# Patient Record
Sex: FEMALE | Race: WHITE | NOT HISPANIC OR LATINO | Employment: UNEMPLOYED | URBAN - METROPOLITAN AREA
[De-identification: names, ages, dates, MRNs, and addresses within clinical notes are randomized per-mention and may not be internally consistent; named-entity substitution may affect disease eponyms.]

---

## 2017-05-25 DIAGNOSIS — R06.02 SHORTNESS OF BREATH: ICD-10-CM

## 2017-05-25 DIAGNOSIS — R07.9 CHEST PAIN: Primary | ICD-10-CM

## 2017-07-17 ENCOUNTER — OFFICE VISIT (OUTPATIENT)
Dept: NEUROLOGY | Facility: HOSPITAL | Age: 57
End: 2017-07-17
Attending: INTERNAL MEDICINE
Payer: COMMERCIAL

## 2017-07-17 VITALS
WEIGHT: 264.81 LBS | HEIGHT: 66 IN | HEART RATE: 56 BPM | SYSTOLIC BLOOD PRESSURE: 141 MMHG | BODY MASS INDEX: 42.56 KG/M2 | DIASTOLIC BLOOD PRESSURE: 72 MMHG | TEMPERATURE: 98 F

## 2017-07-17 DIAGNOSIS — R07.9 CHEST PAIN, UNSPECIFIED TYPE: Primary | ICD-10-CM

## 2017-07-17 PROCEDURE — 99214 OFFICE O/P EST MOD 30 MIN: CPT | Performed by: INTERNAL MEDICINE

## 2017-07-17 RX ORDER — IBUPROFEN 200 MG
200 TABLET ORAL EVERY 6 HOURS PRN
COMMUNITY
End: 2021-10-06

## 2017-07-17 RX ORDER — ASPIRIN 81 MG/1
81 TABLET ORAL DAILY
COMMUNITY
End: 2018-08-23

## 2017-07-17 NOTE — PROGRESS NOTES
"[facility]    CC: abdominal pain     HPI 56 y.o. female with two months of constant mld epigastric pain not associated with vomiting. Pertinent history is significant for ERCP (10/2016) with purulent bile c/w cholangitis and 8 mm obstructing stone and biliary sludge. Stone was removed at that time and patient then had a lap tristin (10/2016). She states following the surgery she had no further pain until two months ago when pain started at her left shoulder/back and radiated to her chest. It was not associated with shortness of breath. She was evaluated by a cardiologist; NM perfusion scan was negative. A week later she then had epigastric pain that radiated to her chest. She states pain is dull and different than her usual acid reflux pain. She states pain is worse with movement. She denies any improvement or worsening with Tums or food. She intermittently has the shoulder and chest pain. She ahd a EGD in 2016 that showed reflux esophagitis at [facility] and colonoscopy in 2016 that showed a 3mm polyp that was removed (path with tubular adenoma).      Past Medical History:   Diagnosis Date    Arthritis     Claustrophobia     Fibromyalgia     GERD (gastroesophageal reflux disease)     Hypertension          Review of Systems  General ROS: negative for chills, fever or weight loss  Cardiovascular ROS: no chest pain or dyspnea on exertion  Gastrointestinal ROS: positive abdominal pain, no change in bowel habits, or black/ bloody stools    Physical Examination  BP (!) 141/72   Pulse (!) 56   Temp 98 °F (36.7 °C) (Oral)   Ht 5' 6" (1.676 m)   Wt 120.1 kg (264 lb 12.8 oz)   BMI 42.74 kg/m²   General appearance: alert, cooperative, no distress  HENT: Normocephalic, atraumatic, neck symmetrical, no nasal discharge   Lungs: clear to auscultation bilaterally, no dullness to percussion bilaterally  Heart: regular rate and rhythm without rub; no displacement of the PMI   Abdomen: ventral hernia present, moderate " tenderness with deep palpation, soft; bowel sounds normoactive; no organomegaly  Extremities: extremities symmetric; no clubbing, cyanosis, or edema  Neurologic: Alert and oriented X 3, normal strength, normal coordination and gait    Labs:  Lipase 87  T.mary 0.7  Alk phos 95  AST 28  ALT 32    Imaging:  CXR (5/31/17): There is no pneumothorax or pleural fluid. The cardiac silhouette is not enlarged.     I have personally reviewed the above film.     Assessment:   56 year old female with 2 months constant epigastric pain, worse with movement suspicious for costochondritis. She has chronic typical GERD which is treated with antacids. Her epigastric/chest pain is not worse or better with eating and is relatively mild. Evaluation for CAD by stress test is negative     Plan:  - Pain more consistent with costochondritis. Ordered Xray of ribs, 2 view to further evaluate. Will call patient with results.   - Recommended trial of Naprosyn for pain. Will call patient to evaluate for improvement in pain with therapy in 1-2 weeks.   -Conservative management unless pain is progressive in nature     Yamil Quinteros MD   [address] [place] [address], [address]   [place] [address] [phone]

## 2017-08-02 ENCOUNTER — TELEPHONE (OUTPATIENT)
Dept: NEUROLOGY | Facility: HOSPITAL | Age: 57
End: 2017-08-02

## 2017-08-02 NOTE — TELEPHONE ENCOUNTER
----- Message from Lolis Miller sent at 8/2/2017  2:41 PM CDT -----  Contact: Patient  GI- Patient called to get her xray results. Call back number [phone]

## 2017-08-07 ENCOUNTER — TELEPHONE (OUTPATIENT)
Dept: NEUROLOGY | Facility: HOSPITAL | Age: 57
End: 2017-08-07

## 2017-08-07 NOTE — TELEPHONE ENCOUNTER
[facility] Note    I have left a message for the patient informing her that the Xray of her ribs was unrevealing for a cause for her pain.  I have requested that she call my office to let me know how her symptoms are and if her pain is improved with naprosyn.

## 2017-08-09 ENCOUNTER — TELEPHONE (OUTPATIENT)
Dept: NEUROLOGY | Facility: HOSPITAL | Age: 57
End: 2017-08-09

## 2017-08-09 NOTE — TELEPHONE ENCOUNTER
----- Message from Sangeetha Bronson sent at 8/9/2017  2:55 PM CDT -----  Contact: Patient  GI- Patient would like a call back as soon as possible. She has questions. Patient can be reached at [phone].

## 2017-08-16 ENCOUNTER — TELEPHONE (OUTPATIENT)
Dept: NEUROLOGY | Facility: HOSPITAL | Age: 57
End: 2017-08-16

## 2018-04-19 NOTE — TELEPHONE ENCOUNTER
----- Message from aSngeetha Bronson sent at 8/15/2017 10:41 AM CDT -----  Contact: Patient  GI- patient left [place] paperwork over a month ago and needs to pick it up today at 2 pm. Please leave at . Patient can be reached at  [phone].  
Pt waiting in lobby for paperwork.  Pt instructed Dr. Quinteros in clinic and requested to wait until paperwork is signed.      Pt received paperwork with MD's signature.   
(0) understands/communicates without difficulty

## 2018-05-11 ENCOUNTER — OFFICE VISIT (OUTPATIENT)
Dept: URGENT CARE | Facility: CLINIC | Age: 58
End: 2018-05-11
Payer: COMMERCIAL

## 2018-05-11 VITALS
RESPIRATION RATE: 18 BRPM | DIASTOLIC BLOOD PRESSURE: 90 MMHG | BODY MASS INDEX: 42.43 KG/M2 | WEIGHT: 264 LBS | OXYGEN SATURATION: 98 % | TEMPERATURE: 99 F | HEIGHT: 66 IN | SYSTOLIC BLOOD PRESSURE: 163 MMHG | HEART RATE: 59 BPM

## 2018-05-11 DIAGNOSIS — R53.1 WEAKNESS: ICD-10-CM

## 2018-05-11 DIAGNOSIS — R05.9 COUGH: ICD-10-CM

## 2018-05-11 DIAGNOSIS — J02.0 STREP PHARYNGITIS: Primary | ICD-10-CM

## 2018-05-11 LAB
CTP QC/QA: YES
CTP QC/QA: YES
FLUAV AG NPH QL: NEGATIVE
FLUBV AG NPH QL: NEGATIVE
GLUCOSE SERPL-MCNC: 86 MG/DL (ref 70–110)
POC ANION GAP: 19 MMOL/L
POC BUN: 15 MMOL/L
POC CHLORIDE: 99 MMOL/L
POC CREATININE: 1 MG/DL (ref 0.6–1.3)
POC HEMATOCRIT: 42 %PCV (ref 37–47)
POC HEMOGLOBIN: 14.3 G/DL (ref 12.5–16)
POC ICA: 1.17 MMOL/L
POC POTASSIUM: 3.7 MMOL/L
POC SODIUM: 142 MMOL/L
POC TCO2: 29 MMOL/L
S PYO RRNA THROAT QL PROBE: POSITIVE

## 2018-05-11 PROCEDURE — 99213 OFFICE O/P EST LOW 20 MIN: CPT | Mod: S$GLB,,, | Performed by: NURSE PRACTITIONER

## 2018-05-11 PROCEDURE — 87804 INFLUENZA ASSAY W/OPTIC: CPT | Mod: 59,QW,S$GLB, | Performed by: NURSE PRACTITIONER

## 2018-05-11 PROCEDURE — 80047 BASIC METABLC PNL IONIZED CA: CPT | Mod: QW,S$GLB,, | Performed by: NURSE PRACTITIONER

## 2018-05-11 PROCEDURE — 87880 STREP A ASSAY W/OPTIC: CPT | Mod: QW,S$GLB,, | Performed by: NURSE PRACTITIONER

## 2018-05-11 RX ORDER — AMOXICILLIN 875 MG/1
875 TABLET, FILM COATED ORAL EVERY 12 HOURS
Qty: 20 TABLET | Refills: 0 | Status: SHIPPED | OUTPATIENT
Start: 2018-05-11 | End: 2018-05-21

## 2018-05-11 RX ORDER — BENZONATATE 100 MG/1
200 CAPSULE ORAL 3 TIMES DAILY PRN
Qty: 20 CAPSULE | Refills: 0 | Status: SHIPPED | OUTPATIENT
Start: 2018-05-11 | End: 2018-08-23

## 2018-05-11 NOTE — PROGRESS NOTES
"Subjective:       Patient ID: Dipika Pisano is a 57 y.o. female.    Vitals:  height is 5' 6" (1.676 m) and weight is 119.7 kg (264 lb). Her temperature is 99 °F (37.2 °C). Her blood pressure is 163/90 (abnormal) and her pulse is 59 (abnormal). Her respiration is 18 and oxygen saturation is 98%.     Chief Complaint: Weakness    This is a 57 y.o. female with Past Medical History:  No date: Arthritis  No date: Claustrophobia  No date: Fibromyalgia  No date: GERD (gastroesophageal reflux disease)  No date: Hypertension   Past Surgical History:  No date: BREAST BIOPSY Left  No date: EYE SURGERY Right  who presents today with a chief complaint of clammy, congestion and cough since Wednesday.  Vomiting on   Wednesday appetite loss until yesterday, able to eat and drink yesterday.   Yesterday with soft bowel.  Did have one loose stool yesterday.  Did have urinary frequency.  Last night thought she was improved, but feel worse.    Denies chest pain with or without radiation to arm or neck, denies HA.          Cough   This is a new problem. The current episode started in the past 7 days. The problem has been unchanged. The problem occurs every few hours. The cough is non-productive. Associated symptoms include myalgias, nasal congestion and sweats. Pertinent negatives include no chest pain, chills, fever, headaches, postnasal drip, rash, sore throat or shortness of breath. Nothing aggravates the symptoms. She has tried nothing for the symptoms. There is no history of asthma.     Review of Systems   Constitution: Positive for weakness. Negative for chills and fever.   HENT: Positive for congestion. Negative for postnasal drip and sore throat.    Eyes: Negative for blurred vision.   Cardiovascular: Negative for chest pain.   Respiratory: Positive for cough. Negative for shortness of breath.    Skin: Negative for rash.   Musculoskeletal: Positive for myalgias. Negative for back pain and joint pain. "   Gastrointestinal: Negative for abdominal pain, diarrhea, nausea and vomiting.   Neurological: Negative for headaches.   Psychiatric/Behavioral: The patient is not nervous/anxious.        Objective:      Physical Exam   Constitutional: She is oriented to person, place, and time. She appears well-developed and well-nourished. She is cooperative.  Non-toxic appearance. She does not appear ill. No distress.   HENT:   Head: Normocephalic and atraumatic.   Right Ear: Hearing, tympanic membrane, external ear and ear canal normal.   Left Ear: Hearing, tympanic membrane, external ear and ear canal normal.   Nose: Nose normal. No mucosal edema, rhinorrhea or nasal deformity. No epistaxis. Right sinus exhibits no maxillary sinus tenderness and no frontal sinus tenderness. Left sinus exhibits no maxillary sinus tenderness and no frontal sinus tenderness.   Mouth/Throat: Uvula is midline and mucous membranes are normal. No trismus in the jaw. Normal dentition. No uvula swelling. Posterior oropharyngeal erythema present. No oropharyngeal exudate, posterior oropharyngeal edema or tonsillar abscesses. Tonsils are 2+ on the right. Tonsils are 2+ on the left. No tonsillar exudate.   Eyes: Conjunctivae and lids are normal. No scleral icterus.   Sclera clear bilat   Neck: Trachea normal, full passive range of motion without pain and phonation normal. Neck supple.   Cardiovascular: Normal rate, regular rhythm, S1 normal, S2 normal, normal heart sounds, intact distal pulses and normal pulses.   No extrasystoles are present. PMI is not displaced.    No murmur heard.  Pulmonary/Chest: Effort normal and breath sounds normal. No respiratory distress. She has no decreased breath sounds. She has no wheezes. She has no rhonchi. She has no rales.   Abdominal: Soft. Normal appearance and bowel sounds are normal. She exhibits no shifting dullness, no distension, no pulsatile liver, no fluid wave, no abdominal bruit, no ascites, no pulsatile  midline mass and no mass. There is no hepatosplenomegaly, splenomegaly or hepatomegaly. There is no tenderness. There is no rigidity, no rebound, no guarding, no CVA tenderness, no tenderness at McBurney's point and negative Mittal's sign.   Musculoskeletal: Normal range of motion. She exhibits no edema or deformity.   Lymphadenopathy:     She has cervical adenopathy.        Right cervical: Superficial cervical adenopathy present. No deep cervical and no posterior cervical adenopathy present.       Left cervical: Superficial cervical adenopathy present. No deep cervical and no posterior cervical adenopathy present.   Neurological: She is alert and oriented to person, place, and time. She exhibits normal muscle tone. Coordination normal.   Skin: Skin is warm, dry and intact. Capillary refill takes less than 2 seconds. She is not diaphoretic. No cyanosis. No pallor. Nails show no clubbing.   Psychiatric: She has a normal mood and affect. Her speech is normal and behavior is normal. Judgment and thought content normal. She is not actively hallucinating. Cognition and memory are normal. She is attentive.   Nursing note and vitals reviewed.      Assessment:       1. Strep pharyngitis    2. Cough    3. Weakness        Plan:     Discussed patient's EKG with Dr. Santos via phone.  Agrees EKG indicates no issues at this time.  Advised to follow up with PCP for recurring weakness.      Results for orders placed or performed in visit on 05/11/18   POCT Influenza A/B   Result Value Ref Range    Rapid Influenza A Ag Negative Negative    Rapid Influenza B Ag Negative Negative     Acceptable Yes    POCT RAPID STREP A   Result Value Ref Range    Rapid Strep A Screen Positive (A) Negative     Acceptable Yes    POCT Chemistry Panel (Manual)   Result Value Ref Range    POC Sodium 142 MMOL/L    POC Potassium 3.7 MMOL/L    POC Chloride 99 MMOL/L    POC BUN 15 MMOL/L    POC Glucose 86 (A) 70 - 110 mg/dL     "POC Creatinine 1.0 (A) 0.6 - 1.3 mg/dL    POC iCA 1.17 MMOL/L    POC TCO2 29 MMOL/L    POC Hematocrit 42 37 - 47 %PCV    POC Hemoglobin 14.3 12.5 - 16 g/dL    POC Anion Gap 19 MMOL/L       Strep pharyngitis  -     POCT RAPID STREP A  -     amoxicillin (AMOXIL) 875 MG tablet; Take 1 tablet (875 mg total) by mouth every 12 (twelve) hours.  Dispense: 20 tablet; Refill: 0    Cough  -     X-Ray Chest PA And Lateral; Future; Expected date: 05/11/2018  -     POCT Influenza A/B  -     benzonatate (TESSALON PERLES) 100 MG capsule; Take 2 capsules (200 mg total) by mouth 3 (three) times daily as needed for Cough.  Dispense: 20 capsule; Refill: 0    Weakness  -     POCT Chemistry Panel (Manual)  -     POCT EKG 12-LEAD (NOT FOR [facility] USE); Future; Expected date: 05/11/2018      Patient Instructions     Please follow up with your Primary care provider within 2-5 days if your signs and symptoms have not resolved or worsen.  The usual course of cold symptoms are 10-14 days.     If your condition worsens or fails to improve we recommend that you receive another evaluation at the emergency room immediately or contact your primary medical clinic to discuss your concerns.     You must understand that you have received an Urgent Care treatment only and that you may be released before all of your medical problems are known or treated.   You, the patient, will arrange for follow up care as instructed.     Tylenol or Ibuprofen can also be used as directed for pain/fever unless you have an allergy to them or medical condition such as stomach ulcers, kidney or liver disease or blood thinners etc for which you should not be taking these type of medications.     Take over the counter cough medication as directed as needed for cough.  You should avoid medications with pseudoephedrine or phenylephrine (any medication with "D") if you have high blood pressure as this can cause an elevation in your blood pressure. Instead consider Corcidin HBP " as needed to prevent an elevated blood pressure.     Natural remedies of symptoms (as needed) include humidification, saline nasal sprays, and/or steamy showers.  Increase fluids, warm tea with honey, cough drops as needed.  You may also use salt water gargles for sore throat.  You have been given an antibiotic to treat your condition today.  Please complete the antibiotic as directed on the bottle.     As with any antibiotics, use probiotics and/or high culture yogurt about 2 hours apart from the antibiotic and about 1 week after the antibiotic to replace the gut marina lost with antibiotic use.      If you are female and on BCP use additional methods to prevent pregnancy while on antibiotics and for one cycle after.           Pharyngitis: Strep (Confirmed)    You have had a positive test for strep throat. Strep throat is a contagious illness. It is spread by coughing, kissing or by touching others after touching your mouth or nose. Symptoms include throat pain that is worse with swallowing, aching all over, headache, and fever. It is treated with antibiotic medicine. This should help you start to feel better in 1 to 2 days.  Home care  · Rest at home. Drink plenty of fluids to you won't get dehydrated.  · No work or school for the first 2 days of taking the antibiotics. After this time, you will not be contagious. You can then return to school or work if you are feeling better.   · Take antibiotic medicine for the full 10 days, even if you feel better. This is very important to ensure the infection is treated. It is also important to prevent medicine-resistant germs from developing. If you were given an antibiotic shot, you don't need any more antibiotics.  · You may use acetaminophen or ibuprofen to control pain or fever, unless another medicine was prescribed for this. Talk with your doctor before taking these medicines if you have chronic liver or kidney disease. Also talk with your doctor if you have had a  stomach ulcer or GI bleeding.  · Throat lozenges or sprays help reduce pain. Gargling with warm saltwater will also reduce throat pain. Dissolve 1/2 teaspoon of salt in 1 glass of warm water. This may be useful just before meals.   · Soft foods are OK. Avoid salty or spicy foods.  Follow-up care  Follow up with your healthcare provider or our staff if you don't get better over the next week.  When to seek medical advice  Call your healthcare provider right away if any of these occur:  · Fever of 100.4ºF (38ºC) or higher, or as directed by your healthcare provider  · New or worsening ear pain, sinus pain, or headache  · Painful lumps in the back of neck  · Stiff neck  · Lymph nodes getting larger or becoming soft in the middle  · You can't swallow liquids or you can't open your mouth wide because of throat pain  · Signs of dehydration. These include very dark urine or no urine, sunken eyes, and dizziness.  · Trouble breathing or noisy breathing  · Muffled voice  · Rash  Date Last Reviewed: 4/13/2015  © 0444-8547 [employer]. [address] [place] [address]. All rights reserved. This information is not intended as a substitute for professional medical care. Always follow your healthcare professional's instructions.

## 2018-05-11 NOTE — PATIENT INSTRUCTIONS
"  Please follow up with your Primary care provider within 2-5 days if your signs and symptoms have not resolved or worsen.  The usual course of cold symptoms are 10-14 days.     If your condition worsens or fails to improve we recommend that you receive another evaluation at the emergency room immediately or contact your primary medical clinic to discuss your concerns.     You must understand that you have received an Urgent Care treatment only and that you may be released before all of your medical problems are known or treated.   You, the patient, will arrange for follow up care as instructed.     Tylenol or Ibuprofen can also be used as directed for pain/fever unless you have an allergy to them or medical condition such as stomach ulcers, kidney or liver disease or blood thinners etc for which you should not be taking these type of medications.     Take over the counter cough medication as directed as needed for cough.  You should avoid medications with pseudoephedrine or phenylephrine (any medication with "D") if you have high blood pressure as this can cause an elevation in your blood pressure. Instead consider Corcidin HBP as needed to prevent an elevated blood pressure.     Natural remedies of symptoms (as needed) include humidification, saline nasal sprays, and/or steamy showers.  Increase fluids, warm tea with honey, cough drops as needed.  You may also use salt water gargles for sore throat.  You have been given an antibiotic to treat your condition today.  Please complete the antibiotic as directed on the bottle.     As with any antibiotics, use probiotics and/or high culture yogurt about 2 hours apart from the antibiotic and about 1 week after the antibiotic to replace the gut marina lost with antibiotic use.      If you are female and on BCP use additional methods to prevent pregnancy while on antibiotics and for one cycle after.           Pharyngitis: Strep (Confirmed)    You have had a positive test " for strep throat. Strep throat is a contagious illness. It is spread by coughing, kissing or by touching others after touching your mouth or nose. Symptoms include throat pain that is worse with swallowing, aching all over, headache, and fever. It is treated with antibiotic medicine. This should help you start to feel better in 1 to 2 days.  Home care  · Rest at home. Drink plenty of fluids to you won't get dehydrated.  · No work or school for the first 2 days of taking the antibiotics. After this time, you will not be contagious. You can then return to school or work if you are feeling better.   · Take antibiotic medicine for the full 10 days, even if you feel better. This is very important to ensure the infection is treated. It is also important to prevent medicine-resistant germs from developing. If you were given an antibiotic shot, you don't need any more antibiotics.  · You may use acetaminophen or ibuprofen to control pain or fever, unless another medicine was prescribed for this. Talk with your doctor before taking these medicines if you have chronic liver or kidney disease. Also talk with your doctor if you have had a stomach ulcer or GI bleeding.  · Throat lozenges or sprays help reduce pain. Gargling with warm saltwater will also reduce throat pain. Dissolve 1/2 teaspoon of salt in 1 glass of warm water. This may be useful just before meals.   · Soft foods are OK. Avoid salty or spicy foods.  Follow-up care  Follow up with your healthcare provider or our staff if you don't get better over the next week.  When to seek medical advice  Call your healthcare provider right away if any of these occur:  · Fever of 100.4ºF (38ºC) or higher, or as directed by your healthcare provider  · New or worsening ear pain, sinus pain, or headache  · Painful lumps in the back of neck  · Stiff neck  · Lymph nodes getting larger or becoming soft in the middle  · You can't swallow liquids or you can't open your mouth wide  because of throat pain  · Signs of dehydration. These include very dark urine or no urine, sunken eyes, and dizziness.  · Trouble breathing or noisy breathing  · Muffled voice  · Rash  Date Last Reviewed: 4/13/2015 © 2000-2017 [employer]. [address] [place] [address], [place] [address]. All rights reserved. This information is not intended as a substitute for professional medical care. Always follow your healthcare professional's instructions.

## 2018-08-23 ENCOUNTER — OFFICE VISIT (OUTPATIENT)
Dept: URGENT CARE | Facility: CLINIC | Age: 58
End: 2018-08-23
Payer: COMMERCIAL

## 2018-08-23 VITALS
TEMPERATURE: 97 F | HEART RATE: 56 BPM | BODY MASS INDEX: 42.43 KG/M2 | OXYGEN SATURATION: 97 % | SYSTOLIC BLOOD PRESSURE: 135 MMHG | HEIGHT: 66 IN | DIASTOLIC BLOOD PRESSURE: 68 MMHG | WEIGHT: 264 LBS | RESPIRATION RATE: 18 BRPM

## 2018-08-23 DIAGNOSIS — R21 EXANTHEM: Primary | ICD-10-CM

## 2018-08-23 PROCEDURE — 99213 OFFICE O/P EST LOW 20 MIN: CPT | Mod: S$GLB,,, | Performed by: EMERGENCY MEDICINE

## 2018-08-23 RX ORDER — LISINOPRIL 10 MG/1
TABLET ORAL
COMMUNITY

## 2018-08-23 RX ORDER — METOPROLOL TARTRATE 50 MG/1
TABLET ORAL
COMMUNITY

## 2018-08-23 RX ORDER — PERMETHRIN 50 MG/G
CREAM TOPICAL ONCE
Qty: 60 G | Refills: 1 | Status: SHIPPED | OUTPATIENT
Start: 2018-08-23 | End: 2018-08-23

## 2018-08-23 NOTE — PROGRESS NOTES
"Subjective:       Patient ID: Dipika Pisano is a 57 y.o. female.    Vitals:  height is 5' 6" (1.676 m) and weight is 119.7 kg (264 lb). Her oral temperature is 97 °F (36.1 °C). Her blood pressure is 135/68 and her pulse is 56 (abnormal). Her respiration is 18 and oxygen saturation is 97%.     Chief Complaint: Rash (possible scabies)    Relative diagnosed with scabies PTA.      Rash   This is a new problem. The current episode started in the past 7 days. The problem has been gradually worsening since onset. The rash is diffuse. The rash is characterized by itchiness and redness. It is unknown if there was an exposure to a precipitant. Past treatments include nothing. The treatment provided no relief.     Review of Systems   Skin: Positive for rash.       Objective:      Physical Exam   Constitutional: She is oriented to person, place, and time. She appears well-developed and well-nourished.   HENT:   Head: Normocephalic and atraumatic.   Cardiovascular: Normal rate, regular rhythm and normal heart sounds.   Pulmonary/Chest: Breath sounds normal.   Musculoskeletal: Normal range of motion.   Neurological: She is alert and oriented to person, place, and time.   Skin:   Erythematous rash UEs, no vesicles/pustules, itchy, no drainage.   Psychiatric: She has a normal mood and affect. Her behavior is normal.       Assessment:       1. Exanthem        Plan:         Exanthem  -     permethrin (ELIMITE) 5 % cream; Apply topically once. for 1 dose  Dispense: 60 g; Refill: 1        Albert Brannon MD  Go to the Emergency Department for any problems  Call your PCP for follow up next available.    "

## 2018-08-23 NOTE — PATIENT INSTRUCTIONS
Albert Brannon MD  Go to the Emergency Department for any problems  Call your PCP for follow up next available.    Scabies     To prevent spread of infection, wash clothing, linens, and toys in very hot water.     Scabies is an infection caused by very tiny mites that burrow into the skin. The mites are called Sarcoptes scabiei. They cause severe itching. Though children are most commonly infected, anyone can get scabies. Scabies mites can pass from person to person through close physical contact. They can also be passed through shared clothing, towels, and bedding. Scabies infection is not usually dangerous, but it is uncomfortable. Because it is so contagious, scabies should be treated immediately to keep the infection from spreading.  Symptoms  Symptoms of scabies appear about 2 to 6 weeks after infection in a child or adult who has never had scabies before. A child or adult who has been infected before will experience symptoms much sooner, in 1 to 4 days. Signs of scabies infection may include:  · Intense itching, especially at night or after a hot bath  · Skin irritations that look like hives, insect bites, pimples, or blisters, especially on warmer areas of the body (such as between the fingers, in the armpits, and in the creases of the wrists, elbows, and knees)  · Sores on the body caused by scratching (the sores may become infected)  · Triana created by mites traveling under the skin, which look like lines on the skins surface  Treating scabies infection  Scabies infections are usually treated with a prescription lotion that kills the mites. The lotion must be applied to the entire body from the neck down. This includes the palms of the hands, soles of the feet, groin, and under the fingernails. The lotion must be left on for 8 to 14 hours. In some cases, a second application of lotion is needed a week after the first. Medicines work quickly, but most children and adults continue to have an itchy rash for  several weeks after treatment. Marks on the skin from scabies usually go away in 1 to 2 weeks, but sometimes take a few months to clear.  Preventing spread of the infection  To prevent reinfection and the spread of scabies to others, follow these instructions:  · Wash the infected persons clothing, towels, bed linens, cloth toys, and other personal items in very hot, soapy water. Dry them thoroughly. Do not share among family members.   · Seal items that cant be washed in plastic bags for 2 weeks.  · Vacuum floors and furniture. Throw the vacuum bag away afterward.  · Notify an infected childs school and caregivers so that other children can be checked and treated.  · Keep an infected child home from  or school until the morning after treatment for scabies.  · Warn children not to share items such as clothing and towels with other children.  · You may need to treat all household members who may have been exposed to scabies, whether they show symptoms or not. Talk with your healthcare provider.  · Do not spray your house with chemicals or pesticides. These can be dangerous to your familys health.  When to call the healthcare provider if:  · The infected person has a fever, red streaks, pain, or swelling of the skin.  · Sores get worse or do not heal.  · New rashes appear or itching continues for more than 2 weeks after treatment.   Date Last Reviewed: 6/1/2016  © 8514-6765 "[employer]". [address] [place] [address], [place] [address]. All rights reserved. This information is not intended as a substitute for professional medical care. Always follow your healthcare professional's instructions.

## 2018-08-26 ENCOUNTER — TELEPHONE (OUTPATIENT)
Dept: URGENT CARE | Facility: CLINIC | Age: 58
End: 2018-08-26

## 2018-08-26 NOTE — TELEPHONE ENCOUNTER
Dos ---08/23/2018 patient states she had to get another lotion . She was only ordered one at the time . She still has itching in certain spots.She has one refill left ,so she will get a refill and see if it has improved .

## 2019-07-11 DIAGNOSIS — I20.9 ANGINAL SYNDROME: ICD-10-CM

## 2019-07-11 DIAGNOSIS — I10 ESSENTIAL HYPERTENSION, MALIGNANT: Primary | ICD-10-CM

## 2020-11-09 ENCOUNTER — OFFICE VISIT (OUTPATIENT)
Dept: SURGERY | Facility: CLINIC | Age: 60
End: 2020-11-09
Payer: MEDICAID

## 2020-11-09 VITALS
SYSTOLIC BLOOD PRESSURE: 152 MMHG | HEIGHT: 66 IN | WEIGHT: 270.75 LBS | BODY MASS INDEX: 43.51 KG/M2 | DIASTOLIC BLOOD PRESSURE: 89 MMHG | HEART RATE: 75 BPM

## 2020-11-09 DIAGNOSIS — R29.898 LEG WEAKNESS, BILATERAL: Primary | ICD-10-CM

## 2020-11-09 PROCEDURE — 99203 OFFICE O/P NEW LOW 30 MIN: CPT | Mod: S$PBB,,, | Performed by: SURGERY

## 2020-11-09 PROCEDURE — 99203 PR OFFICE/OUTPT VISIT, NEW, LEVL III, 30-44 MIN: ICD-10-PCS | Mod: S$PBB,,, | Performed by: SURGERY

## 2020-11-09 PROCEDURE — 99999 PR PBB SHADOW E&M-EST. PATIENT-LVL III: ICD-10-PCS | Mod: PBBFAC,,, | Performed by: SURGERY

## 2020-11-09 PROCEDURE — 99213 OFFICE O/P EST LOW 20 MIN: CPT | Mod: PBBFAC,PN | Performed by: SURGERY

## 2020-11-09 PROCEDURE — 99999 PR PBB SHADOW E&M-EST. PATIENT-LVL III: CPT | Mod: PBBFAC,,, | Performed by: SURGERY

## 2020-11-09 RX ORDER — PANTOPRAZOLE SODIUM 40 MG/1
40 TABLET, DELAYED RELEASE ORAL DAILY
COMMUNITY
Start: 2020-09-23 | End: 2022-10-31

## 2020-11-09 NOTE — H&P
[facility]  OUTPATIENT H&P    REASON FOR VISIT/CC:  Cyst on lower back    HPI: Dipika Pisano is a 60 y.o. female referred for evaluation of reported cyst under lower back.  Patient reports a history of MRI many years ago which showed a cyst in her lower back at which time she was told nothing was required (only MRI records in [facility] are of the cervical and thoracic spine, not lumbar).  She has had progressively bilateral leg weakness and whole body stiffness.  She reports she has difficulty walking more than 20 ft due to leg pain and cannot walk up stairs were bring her feet up onto a step without pulling herself up with her arms.  She has a sensation of a cyst being present in her lower back that is pushing on the inside.  She has no overlying skin changes.  No drainage from the area.  Denies fevers, chills, nausea, vomiting.  Has some urinary incontinence.    I have reviewed the patient's chart including prior progress notes, procedures and testing.     ROS:   Review of Systems   Constitutional: Positive for activity change. Negative for chills, fever and unexpected weight change.   Respiratory: Negative for apnea, chest tightness and shortness of breath.    Cardiovascular: Negative for chest pain, palpitations and leg swelling.   Gastrointestinal: Negative for abdominal pain, nausea and vomiting.   Genitourinary: Negative for difficulty urinating, dysuria and hematuria.   Musculoskeletal: Positive for arthralgias, back pain, gait problem and myalgias.   Neurological: Negative for dizziness, syncope and light-headedness.   Psychiatric/Behavioral: Negative for agitation, behavioral problems and confusion.       PROBLEM LIST:  Patient Active Problem List   Diagnosis    Displacement of thoracic intervertebral disc without myelopathy    Hypertrophy of uterus    Urge incontinence    Urinary frequency    Chronic cholecystitis    Screening for colon cancer    Dyspepsia    Reflux  esophagitis    Benign neoplasm of colon    Calculus of gallbladder with chronic cholecystitis without obstruction    Cholecystitis    Acute pancreatitis    Common bile duct dilatation    Choledocholithiasis    S/P laparoscopic cholecystectomy    Obesity (BMI 30-39.9)    Epigastric pain    Exanthem         HISTORY  Past Medical History:   Diagnosis Date    Arthritis     Claustrophobia     Fibromyalgia     GERD (gastroesophageal reflux disease)     Hypertension        Past Surgical History:   Procedure Laterality Date    BREAST BIOPSY Left     BREAST CYST ASPIRATION      EYE SURGERY Right        Social History     Tobacco Use    Smoking status: Former Smoker     Packs/day: 1.00     Years: 30.00     Pack years: 30.00     Types: Cigarettes     Quit date: 2011     Years since quittin.8    Smokeless tobacco: Never Used   Substance Use Topics    Alcohol use: No    Drug use: No       Family History   Problem Relation Age of Onset    Cancer Mother     Diabetes Father     Heart disease Father     Early death Sister     Heart disease Sister     Breast cancer Maternal Aunt 70         MEDS:  Current Outpatient Medications on File Prior to Visit   Medication Sig Dispense Refill    ibuprofen (ADVIL,MOTRIN) 200 MG tablet Take 200 mg by mouth every 6 (six) hours as needed for Pain.      lisinopril 10 MG tablet lisinopril 10 mg tablet   Take 1 tablet every day by oral route.      metoprolol tartrate (LOPRESSOR) 50 MG tablet metoprolol tartrate 50 mg tablet   Take 1 tablet twice a day by oral route.      pantoprazole (PROTONIX) 40 MG tablet Take 40 mg by mouth once daily.       No current facility-administered medications on file prior to visit.        ALLERGIES:  Review of patient's allergies indicates:   Allergen Reactions    Cat dander     Mold          VITALS:  Vitals:    20 0820   BP: (!) 152/89   Pulse: 75         PHYSICAL EXAM:  Physical Exam  Vitals signs reviewed.    Constitutional:       Appearance: Normal appearance. She is obese.   HENT:      Nose: Nose normal.   Eyes:      General: No scleral icterus.  Neck:      Musculoskeletal: Normal range of motion and neck supple. No neck rigidity.   Cardiovascular:      Rate and Rhythm: Normal rate and regular rhythm.      Pulses: Normal pulses.   Pulmonary:      Effort: Pulmonary effort is normal. No respiratory distress.      Breath sounds: Normal breath sounds.   Abdominal:      General: There is no distension.      Palpations: Abdomen is soft.      Tenderness: There is no abdominal tenderness.   Musculoskeletal: Normal range of motion.         General: No deformity or signs of injury.   Skin:     General: Skin is warm and dry.      Findings: No erythema.      Comments: Maureen cleft examined, no evidence of pilonidal disease, no evidence of subcutaneous mass or cyst   Neurological:      General: No focal deficit present.      Mental Status: She is alert and oriented to person, place, and time.      Motor: Weakness present.   Psychiatric:         Mood and Affect: Mood normal.         Behavior: Behavior normal.         Thought Content: Thought content normal.         Judgment: Judgment normal.           LABS:  Lab Results   Component Value Date    WBC 7.87 2019    RBC 4.51 2019    HGB 13.9 2019    HCT 41.4 2019     2019     Lab Results   Component Value Date    GLU 92 2019     2019    K 4.7 2019     2019    CO2 30 (H) 2019    BUN 28 (H) 2019    CREATININE 1.31 2019    CALCIUM 9.8 2019     Lab Results   Component Value Date    ALT 24 2019    AST 22 2019    ALKPHOS 73 2019    BILITOT 0.4 2019     No results found for: MG, PHOS    IMAGING  Previous MRI reports have been reviewed, no imaging of the lower back/lumbar     ASSESSMENT & PLAN:  60 y.o. female with bilateral lower extremity weakness, body stiffness and  reported history of cyst in the lower back  - symptoms inconsistent with any subcutaneous soft tissue mass  - Review of imaging reports failed to find previous studies examining the area  - send for US of soft tissue of lower back  - even if subcutaneous mass is found I have told the patient that removing it is unlikely to change her symptoms  - may need further work up of bilateral LE weakness and possible MRI of lumbar spine, encouraged f/u with PCP  - will contact with US results  -otherwise rtc prn

## 2020-11-09 NOTE — LETTER
November 10, 2020      MIKA Workman MD  [address]  [address]  [facility] [address]           [facility]  [address]  [facility] [address]  Phone: [phone]  Fax: [phone]          Patient: Dipika Pisano   MR Number: [id number]   YOB: 1960   Date of Visit: 11/9/2020       Dear Dr. MIKA Workman:    Thank you for referring Dipika Pisano to me for evaluation. Attached you will find relevant portions of my assessment and plan of care.    If you have questions, please do not hesitate to call me. I look forward to following Dipika Pisano along with you.    Sincerely,    Farhad Peralta Jr., MD    Enclosure  CC:  No Recipients    If you would like to receive this communication electronically, please contact [email or web address] or [phone] to request more information on [email or web address] Link access.    For providers and/or their staff who would like to refer a patient to [facility], please contact us through our one-stop-shop provider referral line, [facility], at [phone].    If you feel you have received this communication in error or would no longer like to receive these types of communications, please e-mail [email or web address]

## 2021-05-13 ENCOUNTER — OFFICE VISIT (OUTPATIENT)
Dept: OBSTETRICS AND GYNECOLOGY | Facility: CLINIC | Age: 61
End: 2021-05-13
Payer: MEDICAID

## 2021-05-13 VITALS
HEART RATE: 96 BPM | DIASTOLIC BLOOD PRESSURE: 90 MMHG | RESPIRATION RATE: 17 BRPM | SYSTOLIC BLOOD PRESSURE: 138 MMHG | BODY MASS INDEX: 44.36 KG/M2 | HEIGHT: 66 IN | WEIGHT: 276 LBS

## 2021-05-13 DIAGNOSIS — R32 URINARY INCONTINENCE, UNSPECIFIED TYPE: ICD-10-CM

## 2021-05-13 DIAGNOSIS — Z12.4 CERVICAL CANCER SCREENING: ICD-10-CM

## 2021-05-13 DIAGNOSIS — Z12.39 ENCOUNTER FOR BREAST CANCER SCREENING USING NON-MAMMOGRAM MODALITY: ICD-10-CM

## 2021-05-13 DIAGNOSIS — Z01.419 WELL WOMAN EXAM WITH ROUTINE GYNECOLOGICAL EXAM: Primary | ICD-10-CM

## 2021-05-13 PROCEDURE — 99999 PR PBB SHADOW E&M-EST. PATIENT-LVL III: ICD-10-PCS | Mod: PBBFAC,,, | Performed by: OBSTETRICS & GYNECOLOGY

## 2021-05-13 PROCEDURE — 87624 HPV HI-RISK TYP POOLED RSLT: CPT | Performed by: OBSTETRICS & GYNECOLOGY

## 2021-05-13 PROCEDURE — 99999 PR PBB SHADOW E&M-EST. PATIENT-LVL III: CPT | Mod: PBBFAC,,, | Performed by: OBSTETRICS & GYNECOLOGY

## 2021-05-13 PROCEDURE — 99386 PREV VISIT NEW AGE 40-64: CPT | Mod: S$PBB,,, | Performed by: OBSTETRICS & GYNECOLOGY

## 2021-05-13 PROCEDURE — 99386 PR PREVENTIVE VISIT,NEW,40-64: ICD-10-PCS | Mod: S$PBB,,, | Performed by: OBSTETRICS & GYNECOLOGY

## 2021-05-13 PROCEDURE — 99213 OFFICE O/P EST LOW 20 MIN: CPT | Mod: PBBFAC | Performed by: OBSTETRICS & GYNECOLOGY

## 2021-05-13 PROCEDURE — 88175 CYTOPATH C/V AUTO FLUID REDO: CPT | Performed by: OBSTETRICS & GYNECOLOGY

## 2021-05-17 ENCOUNTER — TELEPHONE (OUTPATIENT)
Dept: OBSTETRICS AND GYNECOLOGY | Facility: CLINIC | Age: 61
End: 2021-05-17

## 2021-05-18 LAB
FINAL PATHOLOGIC DIAGNOSIS: NORMAL
Lab: NORMAL

## 2021-05-25 LAB
HPV HR 12 DNA SPEC QL NAA+PROBE: NEGATIVE
HPV16 AG SPEC QL: POSITIVE
HPV18 DNA SPEC QL NAA+PROBE: NEGATIVE

## 2021-05-27 ENCOUNTER — TELEPHONE (OUTPATIENT)
Dept: OBSTETRICS AND GYNECOLOGY | Facility: CLINIC | Age: 61
End: 2021-05-27

## 2021-08-06 ENCOUNTER — LAB VISIT (OUTPATIENT)
Dept: FAMILY MEDICINE | Facility: CLINIC | Age: 61
End: 2021-08-06
Payer: MEDICAID

## 2021-08-06 DIAGNOSIS — R05.9 COUGH: ICD-10-CM

## 2021-08-06 PROCEDURE — U0005 INFEC AGEN DETEC AMPLI PROBE: HCPCS | Performed by: NURSE PRACTITIONER

## 2021-08-06 PROCEDURE — U0003 INFECTIOUS AGENT DETECTION BY NUCLEIC ACID (DNA OR RNA); SEVERE ACUTE RESPIRATORY SYNDROME CORONAVIRUS 2 (SARS-COV-2) (CORONAVIRUS DISEASE [COVID-19]), AMPLIFIED PROBE TECHNIQUE, MAKING USE OF HIGH THROUGHPUT TECHNOLOGIES AS DESCRIBED BY CMS-2020-01-R: HCPCS | Performed by: NURSE PRACTITIONER

## 2021-08-07 LAB
SARS-COV-2 RNA RESP QL NAA+PROBE: NOT DETECTED
SARS-COV-2- CYCLE NUMBER: -1

## 2021-10-06 ENCOUNTER — OFFICE VISIT (OUTPATIENT)
Dept: UROGYNECOLOGY | Facility: CLINIC | Age: 61
End: 2021-10-06
Payer: MEDICAID

## 2021-10-06 VITALS
BODY MASS INDEX: 43.47 KG/M2 | WEIGHT: 270.5 LBS | HEIGHT: 66 IN | DIASTOLIC BLOOD PRESSURE: 73 MMHG | SYSTOLIC BLOOD PRESSURE: 145 MMHG

## 2021-10-06 DIAGNOSIS — M54.50 CHRONIC MIDLINE LOW BACK PAIN WITHOUT SCIATICA: Primary | ICD-10-CM

## 2021-10-06 DIAGNOSIS — N39.43 POST-VOID DRIBBLING: ICD-10-CM

## 2021-10-06 DIAGNOSIS — Z87.448 HISTORY OF HEMATURIA: ICD-10-CM

## 2021-10-06 DIAGNOSIS — G89.29 CHRONIC MIDLINE LOW BACK PAIN WITHOUT SCIATICA: Primary | ICD-10-CM

## 2021-10-06 DIAGNOSIS — N95.2 VAGINAL ATROPHY: ICD-10-CM

## 2021-10-06 DIAGNOSIS — R15.1 FECAL SOILING: ICD-10-CM

## 2021-10-06 DIAGNOSIS — N39.41 URINARY INCONTINENCE, URGE: ICD-10-CM

## 2021-10-06 DIAGNOSIS — N89.8 VAGINAL DISCHARGE: ICD-10-CM

## 2021-10-06 DIAGNOSIS — R19.5 LOOSE STOOLS: ICD-10-CM

## 2021-10-06 DIAGNOSIS — N39.46 URINARY INCONTINENCE, MIXED: ICD-10-CM

## 2021-10-06 PROCEDURE — 87086 URINE CULTURE/COLONY COUNT: CPT | Performed by: OBSTETRICS & GYNECOLOGY

## 2021-10-06 PROCEDURE — 99205 OFFICE O/P NEW HI 60 MIN: CPT | Mod: 25,S$PBB,, | Performed by: OBSTETRICS & GYNECOLOGY

## 2021-10-06 PROCEDURE — 51700 PR IRRIGATION, BLADDER: ICD-10-PCS | Mod: S$PBB,,, | Performed by: OBSTETRICS & GYNECOLOGY

## 2021-10-06 PROCEDURE — 99999 PR PBB SHADOW E&M-EST. PATIENT-LVL III: ICD-10-PCS | Mod: PBBFAC,,, | Performed by: OBSTETRICS & GYNECOLOGY

## 2021-10-06 PROCEDURE — 99213 OFFICE O/P EST LOW 20 MIN: CPT | Mod: PBBFAC | Performed by: OBSTETRICS & GYNECOLOGY

## 2021-10-06 PROCEDURE — 87591 N.GONORRHOEAE DNA AMP PROB: CPT | Mod: 59 | Performed by: OBSTETRICS & GYNECOLOGY

## 2021-10-06 PROCEDURE — 51700 IRRIGATION OF BLADDER: CPT | Mod: PBBFAC | Performed by: OBSTETRICS & GYNECOLOGY

## 2021-10-06 PROCEDURE — 99999 PR PBB SHADOW E&M-EST. PATIENT-LVL III: CPT | Mod: PBBFAC,,, | Performed by: OBSTETRICS & GYNECOLOGY

## 2021-10-06 PROCEDURE — 99205 PR OFFICE/OUTPT VISIT, NEW, LEVL V, 60-74 MIN: ICD-10-PCS | Mod: 25,S$PBB,, | Performed by: OBSTETRICS & GYNECOLOGY

## 2021-10-06 PROCEDURE — 87491 CHLMYD TRACH DNA AMP PROBE: CPT | Performed by: OBSTETRICS & GYNECOLOGY

## 2021-10-06 PROCEDURE — 51700 IRRIGATION OF BLADDER: CPT | Mod: S$PBB,,, | Performed by: OBSTETRICS & GYNECOLOGY

## 2021-10-06 PROCEDURE — 87481 CANDIDA DNA AMP PROBE: CPT | Mod: 59 | Performed by: OBSTETRICS & GYNECOLOGY

## 2021-10-06 RX ORDER — POLYETHYLENE GLYCOL 3350, SODIUM SULFATE ANHYDROUS, SODIUM BICARBONATE, SODIUM CHLORIDE, POTASSIUM CHLORIDE 236; 22.74; 6.74; 5.86; 2.97 G/4L; G/4L; G/4L; G/4L; G/4L
POWDER, FOR SOLUTION ORAL
COMMUNITY
End: 2021-10-06

## 2021-10-06 RX ORDER — CIPROFLOXACIN 500 MG/1
TABLET ORAL
COMMUNITY
End: 2021-10-06

## 2021-10-06 RX ORDER — SOLIFENACIN SUCCINATE 10 MG/1
10 TABLET, FILM COATED ORAL DAILY
Qty: 30 TABLET | Refills: 11 | Status: SHIPPED | OUTPATIENT
Start: 2021-10-06 | End: 2022-10-31

## 2021-10-06 RX ORDER — ESTRADIOL 0.1 MG/G
CREAM VAGINAL
Qty: 42.5 G | Refills: 11 | Status: SHIPPED | OUTPATIENT
Start: 2021-10-06 | End: 2022-10-31

## 2021-10-07 ENCOUNTER — TELEPHONE (OUTPATIENT)
Dept: PAIN MEDICINE | Facility: CLINIC | Age: 61
End: 2021-10-07

## 2021-10-07 LAB
BACTERIAL VAGINOSIS DNA: NEGATIVE
C TRACH DNA SPEC QL NAA+PROBE: NOT DETECTED
CANDIDA GLABRATA DNA: POSITIVE
CANDIDA KRUSEI DNA: NEGATIVE
CANDIDA RRNA VAG QL PROBE: NEGATIVE
N GONORRHOEA DNA SPEC QL NAA+PROBE: NOT DETECTED
T VAGINALIS RRNA GENITAL QL PROBE: NEGATIVE

## 2021-10-08 ENCOUNTER — TELEPHONE (OUTPATIENT)
Dept: UROGYNECOLOGY | Facility: CLINIC | Age: 61
End: 2021-10-08

## 2021-10-08 ENCOUNTER — TELEPHONE (OUTPATIENT)
Dept: ORTHOPEDICS | Facility: CLINIC | Age: 61
End: 2021-10-08

## 2021-10-09 LAB — BACTERIA UR CULT: NO GROWTH

## 2022-10-28 NOTE — PROGRESS NOTES
"PATIENT: Dipika Pisano  MRN: [id number]  DATE: 10/31/2022    Diagnosis:   1. History of deep vein thrombosis    2. Morbid obesity      Chief Complaint: Deep Vein Thrombosis    Oncologic History:      Oncologic History     Oncologic Treatment     Pathology       Subjective:    History of Present Illness: Ms. Pisano is a 62 y.o. female who presents for evaluation and management of deep vein thrombosis. He is referred by Dr. Chilel    - 2022, symptoms were left leg swelling/pain. She underwent ultrasound of legs in [place], which was reported as negative. Of note, she drove to [place] for her sister's , but she reports getting out the car frequently.  - Symptoms worsened, so she went to an emergency room in [place]. She reports that "40 clots" were removed, and a stent was placed.  - he was started on enoxaparin, followed by apixaban  - she reports that she was also diagnosed with clots in her right leg in [place] as well.  - she denies family history of thrombosis.  - today, she endorses fatigue, abdominal pain. She denies shortness of breath, chest pain, nausea, vomiting, diarrhea, constipation.        Past medical, surgical, family, and social histories have been reviewed and updated below.    Past Medical History:   Past Medical History:   Diagnosis Date    Arthritis     Claustrophobia     Fibromyalgia     GERD (gastroesophageal reflux disease)     Hypertension        Past Surgical History:   Past Surgical History:   Procedure Laterality Date    BREAST BIOPSY Left     BREAST CYST ASPIRATION      CHOLECYSTECTOMY      EYE SURGERY Right        Family History:   Family History   Problem Relation Age of Onset    Cancer Mother     Diabetes Father     Heart disease Father     Early death Sister     Heart disease Sister     Breast cancer Maternal Aunt 70       Social History:  reports that she quit smoking about 11 years ago. Her smoking use included cigarettes. She has a 30.00 pack-year " smoking history. She has quit using smokeless tobacco. She reports that she does not drink alcohol and does not use drugs.    Allergies:  Review of patient's allergies indicates:   Allergen Reactions    Cat dander     Mold        Medications:  Current Outpatient Medications   Medication Sig Dispense Refill    estradioL (ESTRACE) 0.01 % (0.1 mg/gram) vaginal cream 0.5 grams with applicator or dime-sized amount with finger in vagina nightly x 2 weeks, then twice a week thereafter 42.5 g 11    lisinopril 10 MG tablet lisinopril 10 mg tablet   Take 1 tablet every day by oral route.      metoprolol tartrate (LOPRESSOR) 50 MG tablet metoprolol tartrate 50 mg tablet   Take 1 tablet twice a day by oral route.      pantoprazole (PROTONIX) 40 MG tablet Take 40 mg by mouth once daily.      solifenacin (VESICARE) 10 MG tablet Take 1 tablet (10 mg total) by mouth once daily. 30 tablet 11     No current facility-administered medications for this visit.       Review of Systems   Constitutional:  Positive for fatigue. Negative for appetite change and unexpected weight change.   HENT:  Negative for mouth sores.    Eyes:  Negative for visual disturbance.   Respiratory:  Negative for cough and shortness of breath.    Cardiovascular:  Negative for chest pain.   Gastrointestinal:  Positive for abdominal pain. Negative for diarrhea.   Genitourinary:  Positive for frequency.   Musculoskeletal:  Positive for back pain.   Skin:  Negative for rash.   Neurological:  Negative for headaches.   Hematological:  Negative for adenopathy.   Psychiatric/Behavioral:  The patient is not nervous/anxious.      ECOG Performance Status:   ECOG SCORE 1          Objective:      Vitals:   Vitals:    10/31/22 0921   BP: (!) 143/77   Pulse: 85   SpO2: (!) 93%   Weight: 123.6 kg (272 lb 9.6 oz)     BMI: Body mass index is 44 kg/m².    Physical Exam  Vitals and nursing note reviewed.   Constitutional:       Appearance: She is well-developed.   HENT:      Head:  "Normocephalic and atraumatic.   Eyes:      Pupils: Pupils are equal, round, and reactive to light.   Cardiovascular:      Rate and Rhythm: Normal rate and regular rhythm.   Pulmonary:      Effort: Pulmonary effort is normal.      Breath sounds: Normal breath sounds.   Abdominal:      General: Bowel sounds are normal.      Palpations: Abdomen is soft.   Musculoskeletal:         General: Normal range of motion.      Cervical back: Normal range of motion and neck supple.   Skin:     General: Skin is warm and dry.   Neurological:      Mental Status: She is alert and oriented to person, place, and time.   Psychiatric:         Behavior: Behavior normal.         Thought Content: Thought content normal.         Judgment: Judgment normal.       Laboratory Data:  Labs have been reviewed.    Lab Results   Component Value Date    WBC 5.37 04/01/2022    HGB 11.7 (L) 04/01/2022    HCT 36.7 (L) 04/01/2022    MCV 94 04/01/2022     04/01/2022       Imaging:    Ultrasound bilateral lower extremity (6/16/22):  Duplex and color flow Doppler and dynamic compression was performed of the bilateral lower extremity veins was performed.     FINDINGS:  Duplex and color flow Doppler evaluation does not reveal any evidence of deep venous thrombosis within the common femoral, femoral, popliteal, peroneal, posterior tibial, anterior tibial veins bilaterally.     Impression:     No evidence of deep venous thrombosis in either lower extremity.      Assessment:       1. History of deep vein thrombosis    2. Morbid obesity           Plan:     Deep vein thrombosis  - I have reviewed outside records  - March 2022, symptoms were left leg swelling/pain. She underwent ultrasound of legs in [place], which was reported as negative.  - Symptoms worsened, so she went to an emergency room in [place]. She reports that "40 clots" were removed, and a stent was placed.  - he was started on enoxaparin, followed by apixaban  - Ultrasound bilateral lower " extremity (6/16/22) was negative for thrombosis.  - she denies family history of thrombosis.  - perhaps the thromboses were related to prolonged immobility in the setting of driving to [place], although she notes getting out the car frequently during the trip. They also placed a stent in her left leg/pelvis, which makes me think of the diagnosis of May Thurner syndrome.  - I will perform a hypercoagulable workup. Check factor 5 leiden, prothrombin.  - in terms of long-term anticoagulation, I think it's reasonable to continue anticoagulation given her stent placement and possible May Thurner syndrome.  - refer to vascular surgery for evaluation for possible May Thurner syndrome and for long-term stent plan in terms of antiplatelet therapy.  - I will contact her with results of testing. If needed, I will schedule a follow-up appointment.    2. Morbid obesity  - Body mass index is 44 kg/m².  - obesity is a mild risk factor for thrombosis  - I encouraged weight loss    3. Advance Care Planning     Date: 10/31/2022    Power of   I initiated the process of advance care planning today and explained the importance of this process to the patient.  I introduced the concept of advance directives to the patient, as well. Then the patient received detailed information about the importance of designating a Health Care Power of  (HCPOA). She was also instructed to communicate with this person about their wishes for future healthcare, should she become sick and lose decision-making capacity. The patient has not previously appointed a HCPOA. After our discussion, the patient would like to speak to her family before filling out the form.         - I will contact her with results of testing. If needed, I will schedule a follow-up appointment.    Alec Schmitt M.D.  Hematology/Oncology  [facility] [address], [address]  [place] [address]  Phone: [phone]  Fax: [phone]

## 2022-10-31 ENCOUNTER — TELEPHONE (OUTPATIENT)
Dept: VASCULAR SURGERY | Facility: CLINIC | Age: 62
End: 2022-10-31
Payer: MEDICAID

## 2022-10-31 ENCOUNTER — OFFICE VISIT (OUTPATIENT)
Dept: HEMATOLOGY/ONCOLOGY | Facility: CLINIC | Age: 62
End: 2022-10-31
Payer: MEDICAID

## 2022-10-31 VITALS
OXYGEN SATURATION: 93 % | WEIGHT: 272.63 LBS | BODY MASS INDEX: 44 KG/M2 | SYSTOLIC BLOOD PRESSURE: 143 MMHG | DIASTOLIC BLOOD PRESSURE: 77 MMHG | HEART RATE: 85 BPM

## 2022-10-31 DIAGNOSIS — E66.01 MORBID OBESITY: ICD-10-CM

## 2022-10-31 DIAGNOSIS — Z86.718 HISTORY OF DEEP VEIN THROMBOSIS: Primary | ICD-10-CM

## 2022-10-31 PROCEDURE — 99204 PR OFFICE/OUTPT VISIT, NEW, LEVL IV, 45-59 MIN: ICD-10-PCS | Mod: S$PBB,,, | Performed by: INTERNAL MEDICINE

## 2022-10-31 PROCEDURE — 99214 OFFICE O/P EST MOD 30 MIN: CPT | Mod: PBBFAC,PN | Performed by: INTERNAL MEDICINE

## 2022-10-31 PROCEDURE — 99999 PR PBB SHADOW E&M-EST. PATIENT-LVL IV: CPT | Mod: PBBFAC,,, | Performed by: INTERNAL MEDICINE

## 2022-10-31 PROCEDURE — 99204 OFFICE O/P NEW MOD 45 MIN: CPT | Mod: S$PBB,,, | Performed by: INTERNAL MEDICINE

## 2022-10-31 PROCEDURE — 99999 PR PBB SHADOW E&M-EST. PATIENT-LVL IV: ICD-10-PCS | Mod: PBBFAC,,, | Performed by: INTERNAL MEDICINE

## 2022-10-31 PROCEDURE — 4010F ACE/ARB THERAPY RXD/TAKEN: CPT | Mod: CPTII,,, | Performed by: INTERNAL MEDICINE

## 2022-10-31 PROCEDURE — 1160F RVW MEDS BY RX/DR IN RCRD: CPT | Mod: CPTII,,, | Performed by: INTERNAL MEDICINE

## 2022-10-31 PROCEDURE — 3044F HG A1C LEVEL LT 7.0%: CPT | Mod: CPTII,,, | Performed by: INTERNAL MEDICINE

## 2022-10-31 PROCEDURE — 4010F PR ACE/ARB THEARPY RXD/TAKEN: ICD-10-PCS | Mod: CPTII,,, | Performed by: INTERNAL MEDICINE

## 2022-10-31 PROCEDURE — 1159F PR MEDICATION LIST DOCUMENTED IN MEDICAL RECORD: ICD-10-PCS | Mod: CPTII,,, | Performed by: INTERNAL MEDICINE

## 2022-10-31 PROCEDURE — 3044F PR MOST RECENT HEMOGLOBIN A1C LEVEL <7.0%: ICD-10-PCS | Mod: CPTII,,, | Performed by: INTERNAL MEDICINE

## 2022-10-31 PROCEDURE — 3078F PR MOST RECENT DIASTOLIC BLOOD PRESSURE < 80 MM HG: ICD-10-PCS | Mod: CPTII,,, | Performed by: INTERNAL MEDICINE

## 2022-10-31 PROCEDURE — 3077F SYST BP >= 140 MM HG: CPT | Mod: CPTII,,, | Performed by: INTERNAL MEDICINE

## 2022-10-31 PROCEDURE — 1160F PR REVIEW ALL MEDS BY PRESCRIBER/CLIN PHARMACIST DOCUMENTED: ICD-10-PCS | Mod: CPTII,,, | Performed by: INTERNAL MEDICINE

## 2022-10-31 PROCEDURE — 1159F MED LIST DOCD IN RCRD: CPT | Mod: CPTII,,, | Performed by: INTERNAL MEDICINE

## 2022-10-31 PROCEDURE — 3077F PR MOST RECENT SYSTOLIC BLOOD PRESSURE >= 140 MM HG: ICD-10-PCS | Mod: CPTII,,, | Performed by: INTERNAL MEDICINE

## 2022-10-31 PROCEDURE — 3078F DIAST BP <80 MM HG: CPT | Mod: CPTII,,, | Performed by: INTERNAL MEDICINE

## 2022-10-31 NOTE — TELEPHONE ENCOUNTER
----- Message from Vandana Vuong MA sent at 10/31/2022 11:14 AM CDT -----  Good morning, can you please help me schedule this pt for Dr. Schmitt a referral has been placed .    Thanks

## 2022-11-04 ENCOUNTER — PATIENT MESSAGE (OUTPATIENT)
Dept: HEMATOLOGY/ONCOLOGY | Facility: CLINIC | Age: 62
End: 2022-11-04
Payer: MEDICAID

## 2022-11-15 ENCOUNTER — OFFICE VISIT (OUTPATIENT)
Dept: GASTROENTEROLOGY | Facility: CLINIC | Age: 62
End: 2022-11-15
Payer: MEDICAID

## 2022-11-15 VITALS
HEART RATE: 65 BPM | BODY MASS INDEX: 43.92 KG/M2 | WEIGHT: 273.31 LBS | SYSTOLIC BLOOD PRESSURE: 129 MMHG | HEIGHT: 66 IN | DIASTOLIC BLOOD PRESSURE: 81 MMHG

## 2022-11-15 DIAGNOSIS — R10.10 UPPER ABDOMINAL PAIN: Primary | ICD-10-CM

## 2022-11-15 DIAGNOSIS — E66.01 CLASS 3 SEVERE OBESITY DUE TO EXCESS CALORIES WITH BODY MASS INDEX (BMI) OF 40.0 TO 44.9 IN ADULT, UNSPECIFIED WHETHER SERIOUS COMORBIDITY PRESENT: ICD-10-CM

## 2022-11-15 DIAGNOSIS — Z86.010 HISTORY OF COLON POLYPS: ICD-10-CM

## 2022-11-15 PROCEDURE — 1159F MED LIST DOCD IN RCRD: CPT | Mod: CPTII,,, | Performed by: NURSE PRACTITIONER

## 2022-11-15 PROCEDURE — 4010F ACE/ARB THERAPY RXD/TAKEN: CPT | Mod: CPTII,,, | Performed by: NURSE PRACTITIONER

## 2022-11-15 PROCEDURE — 3008F BODY MASS INDEX DOCD: CPT | Mod: CPTII,,, | Performed by: NURSE PRACTITIONER

## 2022-11-15 PROCEDURE — 3074F SYST BP LT 130 MM HG: CPT | Mod: CPTII,,, | Performed by: NURSE PRACTITIONER

## 2022-11-15 PROCEDURE — 1159F PR MEDICATION LIST DOCUMENTED IN MEDICAL RECORD: ICD-10-PCS | Mod: CPTII,,, | Performed by: NURSE PRACTITIONER

## 2022-11-15 PROCEDURE — 3044F PR MOST RECENT HEMOGLOBIN A1C LEVEL <7.0%: ICD-10-PCS | Mod: CPTII,,, | Performed by: NURSE PRACTITIONER

## 2022-11-15 PROCEDURE — 99215 OFFICE O/P EST HI 40 MIN: CPT | Mod: PBBFAC,PO | Performed by: NURSE PRACTITIONER

## 2022-11-15 PROCEDURE — 3044F HG A1C LEVEL LT 7.0%: CPT | Mod: CPTII,,, | Performed by: NURSE PRACTITIONER

## 2022-11-15 PROCEDURE — 1160F PR REVIEW ALL MEDS BY PRESCRIBER/CLIN PHARMACIST DOCUMENTED: ICD-10-PCS | Mod: CPTII,,, | Performed by: NURSE PRACTITIONER

## 2022-11-15 PROCEDURE — 3079F DIAST BP 80-89 MM HG: CPT | Mod: CPTII,,, | Performed by: NURSE PRACTITIONER

## 2022-11-15 PROCEDURE — 3008F PR BODY MASS INDEX (BMI) DOCUMENTED: ICD-10-PCS | Mod: CPTII,,, | Performed by: NURSE PRACTITIONER

## 2022-11-15 PROCEDURE — 99203 PR OFFICE/OUTPT VISIT, NEW, LEVL III, 30-44 MIN: ICD-10-PCS | Mod: S$PBB,,, | Performed by: NURSE PRACTITIONER

## 2022-11-15 PROCEDURE — 1160F RVW MEDS BY RX/DR IN RCRD: CPT | Mod: CPTII,,, | Performed by: NURSE PRACTITIONER

## 2022-11-15 PROCEDURE — 99203 OFFICE O/P NEW LOW 30 MIN: CPT | Mod: S$PBB,,, | Performed by: NURSE PRACTITIONER

## 2022-11-15 PROCEDURE — 99999 PR PBB SHADOW E&M-EST. PATIENT-LVL V: ICD-10-PCS | Mod: PBBFAC,,, | Performed by: NURSE PRACTITIONER

## 2022-11-15 PROCEDURE — 99999 PR PBB SHADOW E&M-EST. PATIENT-LVL V: CPT | Mod: PBBFAC,,, | Performed by: NURSE PRACTITIONER

## 2022-11-15 PROCEDURE — 3079F PR MOST RECENT DIASTOLIC BLOOD PRESSURE 80-89 MM HG: ICD-10-PCS | Mod: CPTII,,, | Performed by: NURSE PRACTITIONER

## 2022-11-15 PROCEDURE — 3074F PR MOST RECENT SYSTOLIC BLOOD PRESSURE < 130 MM HG: ICD-10-PCS | Mod: CPTII,,, | Performed by: NURSE PRACTITIONER

## 2022-11-15 PROCEDURE — 4010F PR ACE/ARB THEARPY RXD/TAKEN: ICD-10-PCS | Mod: CPTII,,, | Performed by: NURSE PRACTITIONER

## 2022-11-15 RX ORDER — APIXABAN 5 MG/1
TABLET, FILM COATED ORAL
COMMUNITY
Start: 2022-11-10

## 2022-11-15 RX ORDER — DICYCLOMINE HYDROCHLORIDE 20 MG/1
20 TABLET ORAL 3 TIMES DAILY PRN
Qty: 60 TABLET | Refills: 2 | Status: SHIPPED | OUTPATIENT
Start: 2022-11-15

## 2022-11-15 RX ORDER — CLOPIDOGREL BISULFATE 75 MG/1
75 TABLET ORAL DAILY
COMMUNITY

## 2022-11-15 NOTE — PATIENT INSTRUCTIONS
Hold Plavix on TBA.  Hold Eliquis on TBA.    SUPREP Instructions    [facility] [address]  [address]    You are scheduled for a EGD/colonoscopy with Dr. Marquez on 12/13/2022 at [facility].   To ensure that your test is accurate and complete, you MUST follow these instructions listed below.  If you have any questions, please call our office at [phone].  Plan on being at the hospital for your procedure for 3-4 hours.    1.  Follow a CLEAR LIQUID DIET for the entire day before your scheduled colonoscopy.  This means no solid food the entire day starting when you wake.  You may have as much of the clear liquids as you want throughout the day.   CLEAR LIQUID DIET:   - Avoid Red, Orange, Purple, and/or Blue food coloring   - NO DAIRY   - You can have:  Coffee with sugar (no creamer), tea, water, soda, apple or white grape juice, chicken or beef broth/bouillon (no meat, noodles, or veggies), green/yellow popsicles, green/yellow Jell-O, lemonade.    2.  AT 5 pm the evening before your colonoscopy, POUR ONE (1) BOTTLE OF SUPREP INTO THE MIXING CONTAINER, PROVIDED INSIDE THE BOX.  ADD WATER TO THE LINE ON THE CONTAINER AND MIX IT WELL.  DRINK THE ENTIRE CONTAINER AND THEN DRINK TWO (2) MORE CONTAINERS OF WATER OVER THE NEXT 1 HOUR.  This is sometimes easier to drink if this solution is cold, so you can mix the solution 20 minutes ahead of time and place in the refrigerator prior to drinking.  You have to drink the solution within 30-45 minutes of mixing it.  Do NOT put this solution over ice.  It IS ok to drink with a straw.    3.  The endoscopy department will call you 1 day before your colonoscopy to tell you the exact time to arrive, AND to tell you the exact time to drink the 2nd portion of your prep (which will be FIVE HOURS BEFORE YOUR ARRIVAL TIME).  At this time given to you, POUR ONE (1) BOTTLE OF SUPREP INTO THE MIXING CONTAINER, PROVIDED INSIDE THE BOX.   ADD WATER TO THE LINE ON THE CONTAINER AND MIX IT WELL.  DRINK THE ENTIRE CONTAINER AND THEN DRINK TWO (2) MORE CONTAINERS OF WATER OVER THE NEXT 1 HOUR.  This is sometimes easier to drink if this solution is cold, so you can mix the solution 20 minutes ahead of time and place in the refrigerator prior to drinking.  You have to drink the solution within 30-45 minutes of mixing it.  Do NOT put this solution over ice.  It IS ok to drink with a straw.  Once this is complete, you may not have ANYTHING else by mouth!    4.  You must have someone with you to DRIVE YOU HOME since you will be receiving IV sedation for the colonoscopy.    5.  It is ok to take MOST of your REGULAR MEDICATIONS  in the morning of your test with a SIP of water.  THE ONLY MEDS YOU NEED TO HOLD ARE YOUR DIABETES MEDICATIONS,  SOME BLOOD PRESSURE MEDS, AND BLOOD THINNERS IF OK'D BY YOUR DOCTOR.  Do NOT have anything else to eat or drink the morning of your colonoscopy.  It is ok to brush your teeth.    6.  If you are on blood thinners THAT YOU HAVE BEEN INSTRUCTED TO HOLD BY YOUR DOCTOR FOR THIS PROCEDURE, then do NOT take this the morning of your colonoscopy.  Do NOT stop these medications on your own, they must be approved to be held by your doctor.  Your colonoscopy can NOT be done if you are on these medications.  Examples of blood thinners include: Coumadin, Aggrenox, Plavix, Pradaxa, Reapro, Pletal, Xarelto, Ticagrelor, Brilinta, Eliquis, and high dose aspirin (325 mg).  You do not have to stop baby aspirin 81 mg.    7.  IF YOU ARE DIABETIC:  NO INSULIN OR ORAL MEDICATIONS THE MORNING OF THE COLONOSCOPY.  TAKE ONLY HALF THE DOSE OF YOUR INSULIN THE DAY BEFORE THE COLONOSCOPY.  DO NOT TAKE ANY ORAL DIABETIC MEDICATIONS THE DAY BEFORE THE COLONOSCOPY.  IF YOU ARE AN INSULIN DEPENDENT DIABETIC WITH UNSTABLE BLOOD SUGARS, NOTIFY YOUR PRIMARY CARE PHYSICIAN FOR INSTRUCTIONS.    You will receive a call a few days before your colonoscopy to tell you  the time to arrive.  If you have not received a call by the day before your procedure, call the Pre-op Coordinator at [phone].

## 2022-11-16 PROBLEM — E66.01 CLASS 3 SEVERE OBESITY DUE TO EXCESS CALORIES WITH BODY MASS INDEX (BMI) OF 40.0 TO 44.9 IN ADULT: Status: ACTIVE | Noted: 2022-11-16

## 2022-11-16 PROBLEM — Z86.0100 HISTORY OF COLON POLYPS: Status: ACTIVE | Noted: 2022-11-16

## 2022-11-16 PROBLEM — Z86.010 HISTORY OF COLON POLYPS: Status: ACTIVE | Noted: 2022-11-16

## 2022-11-16 PROBLEM — E66.813 CLASS 3 SEVERE OBESITY DUE TO EXCESS CALORIES WITH BODY MASS INDEX (BMI) OF 40.0 TO 44.9 IN ADULT: Status: ACTIVE | Noted: 2022-11-16

## 2022-11-16 PROBLEM — R10.10 UPPER ABDOMINAL PAIN: Status: ACTIVE | Noted: 2022-11-16

## 2022-11-16 NOTE — PROGRESS NOTES
Subjective:       Patient ID: Dipika Pisano is a 62 y.o. female.    Chief Complaint: Abdominal Pain    63 y/o female hx of DVT on Plavix and Eliquis referred by PCP for abdominal pain. Hx of gallstone pancreatitis and lap tristin in 2016.     Old records from chart reviewed and summarized, significant for:  - 2015: EGD showed mild reflux esophagitis at the [facility].   - 2015: colonoscopy - normal with exception of one tiny polyp in the cecum resected and retrieved.         Abdominal Pain  This is a recurrent problem. The current episode started more than 1 month ago. The problem occurs intermittently. The pain is located in the RUQ and right flank. The quality of the pain is aching and dull. The abdominal pain does not radiate. Pertinent negatives include no belching, constipation, diarrhea, fever, hematochezia, melena, nausea, vomiting or weight loss. The pain is aggravated by certain positions and palpation. The pain is relieved by Nothing. She has tried nothing for the symptoms. Prior diagnostic workup includes ultrasound.     Past Medical History:   Diagnosis Date    Arthritis     Claustrophobia     Fibromyalgia     GERD (gastroesophageal reflux disease)     Hypertension        Past Surgical History:   Procedure Laterality Date    BREAST BIOPSY Left     BREAST CYST ASPIRATION      CHOLECYSTECTOMY      EYE SURGERY Right        Family History   Problem Relation Age of Onset    Cancer Mother     Diabetes Father     Heart disease Father     Early death Sister     Heart disease Sister     Breast cancer Maternal Aunt 70       Social History     Socioeconomic History    Marital status:    Tobacco Use    Smoking status: Former     Packs/day: 1.00     Years: 30.00     Pack years: 30.00     Types: Cigarettes     Quit date: 2011     Years since quittin.8    Smokeless tobacco: Former   Substance and Sexual Activity    Alcohol use: No    Drug use: No    Sexual activity: Not Currently      "Partners: Male       Review of Systems   Constitutional:  Negative for appetite change, fever, unexpected weight change and weight loss.   HENT:  Negative for trouble swallowing.    Respiratory:  Negative for shortness of breath.    Cardiovascular:  Negative for chest pain.   Gastrointestinal:  Positive for abdominal pain. Negative for constipation, diarrhea, hematochezia, melena, nausea and vomiting.   Neurological:  Negative for dizziness and weakness.   Hematological:  Negative for adenopathy. Does not bruise/bleed easily.   Psychiatric/Behavioral:  Negative for dysphoric mood.        Objective:     Vitals:    11/15/22 1042   BP: 129/81   BP Location: Left arm   Patient Position: Sitting   BP Method: Large (Automatic)   Pulse: 65   Weight: 124 kg (273 lb 4.8 oz)   Height: 5' 6" (1.676 m)          Physical Exam  Constitutional:       General: She is not in acute distress.     Appearance: Normal appearance. She is not ill-appearing.   HENT:      Head: Normocephalic.   Eyes:      Conjunctiva/sclera: Conjunctivae normal.      Pupils: Pupils are equal, round, and reactive to light.   Pulmonary:      Effort: Pulmonary effort is normal. No respiratory distress.   Abdominal:      General: Bowel sounds are normal. There is no distension.      Palpations: Abdomen is soft.      Tenderness: There is abdominal tenderness in the right upper quadrant.   Musculoskeletal:         General: Normal range of motion.      Cervical back: Normal range of motion.   Skin:     General: Skin is warm and dry.   Neurological:      Mental Status: She is alert and oriented to person, place, and time.   Psychiatric:         Mood and Affect: Mood normal.         Behavior: Behavior normal.             Assessment:         ICD-10-CM ICD-9-CM   1. Upper abdominal pain  R10.10 789.09   2. History of colon polyps  Z86.010 V12.72   3. Class 3 severe obesity due to excess calories with body mass index (BMI) of 40.0 to 44.9 in adult, unspecified whether " serious comorbidity present  E66.01 278.01    Z68.41 V85.41       Plan:       Upper abdominal pain  -     Ambulatory referral/consult to Gastroenterology  -     CT Abdomen Pelvis With Contrast; Future; Expected date: 11/15/2022  -     Creatinine, serum; Future; Expected date: 11/15/2022  -     dicyclomine (BENTYL) 20 mg tablet; Take 1 tablet (20 mg total) by mouth 3 (three) times daily as needed (abdominal pain).  Dispense: 60 tablet; Refill: 2  -     Case Request Endoscopy: EGD (ESOPHAGOGASTRODUODENOSCOPY), COLONOSCOPY    History of colon polyps  -     Case Request Endoscopy: EGD (ESOPHAGOGASTRODUODENOSCOPY), COLONOSCOPY    Class 3 severe obesity due to excess calories with body mass index (BMI) of 40.0 to 44.9 in adult, unspecified whether serious comorbidity present    - Schedule EGD/cscope. Obtain clearance to hold anticoagulation therapy. Weight loss advised. Dietary and exercise counseling done.            Follow up if symptoms worsen or fail to improve.     Patient's Medications   New Prescriptions    DICYCLOMINE (BENTYL) 20 MG TABLET    Take 1 tablet (20 mg total) by mouth 3 (three) times daily as needed (abdominal pain).   Previous Medications    CLOPIDOGREL (PLAVIX) 75 MG TABLET    Take 75 mg by mouth once daily.    ELIQUIS 5 MG TAB        LISINOPRIL 10 MG TABLET    lisinopril 10 mg tablet   Take 1 tablet every day by oral route.    METOPROLOL TARTRATE (LOPRESSOR) 50 MG TABLET    metoprolol tartrate 50 mg tablet   Take 1 tablet twice a day by oral route.   Modified Medications    No medications on file   Discontinued Medications    No medications on file

## 2022-11-17 ENCOUNTER — PATIENT MESSAGE (OUTPATIENT)
Dept: GASTROENTEROLOGY | Facility: CLINIC | Age: 62
End: 2022-11-17
Payer: MEDICAID

## 2022-11-17 ENCOUNTER — TELEPHONE (OUTPATIENT)
Dept: GASTROENTEROLOGY | Facility: CLINIC | Age: 62
End: 2022-11-17
Payer: MEDICAID

## 2022-11-17 NOTE — TELEPHONE ENCOUNTER
Informed patient that Dr. Schmitt cleared her to have her Endoscopy. Endoscopy rescheduled to 12/14/2022. Patient instructed last dose of Plavix is 12/09/2022 and last dose of Eliquis is 12/11/2022. Instructions explained to patient, new instructions sent via portal.

## 2022-11-17 NOTE — TELEPHONE ENCOUNTER
----- Message from Alec Schmitt MD sent at 11/16/2022  5:04 PM CST -----  Regarding: RE: Clearance  Good afternoon,    Okay to hold plavix and eliquis per protocol prior to EGD. Resume following procedure.    Thanks!  Alec  ----- Message -----  From: Vandana Vuong MA  Sent: 11/16/2022   2:40 PM CST  To: Alec Schmitt MD  Subject: FW: Clearance                                      ----- Message -----  From: Piper Fung MA  Sent: 11/16/2022  11:47 AM CST  To: [facility] Staff  Subject: Clearance                                        Patient is scheduled for an EGD on 12/13/2022, requesting clearance for patient to hold Plavix and Eliquis prior to procedure.

## 2022-11-30 RX ORDER — SODIUM, POTASSIUM,MAG SULFATES 17.5-3.13G
1 SOLUTION, RECONSTITUTED, ORAL ORAL DAILY
Qty: 1 KIT | Refills: 0 | Status: SHIPPED | OUTPATIENT
Start: 2022-11-30 | End: 2022-12-02

## 2022-12-06 ENCOUNTER — TELEPHONE (OUTPATIENT)
Dept: GASTROENTEROLOGY | Facility: CLINIC | Age: 62
End: 2022-12-06
Payer: MEDICAID

## 2022-12-06 NOTE — TELEPHONE ENCOUNTER
----- Message from Miki Carcamo sent at 12/6/2022  1:30 PM CST -----  Type:  Needs Medical Advice    Who Called: Pt   Symptoms (please be specific):  Pt have a few question regarding her Procedures on 12/14/2022pt also states that she not feeling well   How long has patient had these symptoms:  5 days  Pharmacy name and phone #:  [facility] - [address]  Phone: [phone]  Fax: [phone]  Would the patient rather a call back or a response via [facility]? call  Best Call Back Number: [phone]  Additional Information:

## 2022-12-06 NOTE — TELEPHONE ENCOUNTER
Patient called complaining of a head cold. Wants to know if this will affect procedure. Told patient to call the office on Friday to let me know how she is feeling.

## 2022-12-09 ENCOUNTER — TELEPHONE (OUTPATIENT)
Dept: GASTROENTEROLOGY | Facility: CLINIC | Age: 62
End: 2022-12-09
Payer: MEDICAID

## 2022-12-09 NOTE — TELEPHONE ENCOUNTER
Patient called to reschedule EGD/colonoscopy. Patient has URI. Patient rescheduled to 12/30/2022. Patient told that her last dose of Plavix will be on 12/25/2022 and last dose of Eliquis will be on 12/27/2022. Patient wrote information down.

## 2022-12-09 NOTE — TELEPHONE ENCOUNTER
----- Message from Ananya Vanessa sent at 12/9/2022 10:48 AM CST -----  .Type:  Patient Advice    Who Called:pt  Would the patient rather a call back or a response via [facility]? call  Best Call Back Number: [phone]  Additional Information:     Pt stated that she is trying to speak to someone concerning an update on her current condition.  Would like to know if they can go ahead with procedure on 12/14

## 2022-12-28 ENCOUNTER — TELEPHONE (OUTPATIENT)
Dept: GASTROENTEROLOGY | Facility: CLINIC | Age: 62
End: 2022-12-28
Payer: MEDICAID

## 2022-12-28 NOTE — TELEPHONE ENCOUNTER
Patient was exposed to covid on 12/25/2022. Patient called to reschedule EGD/colonoscopy. Patient rescheduled to 1/12/2023. Patient instructed to restart Plavix and Eliquis. Informed patient her last day to take Plavix will be on 1/7/2023 and last day to take   Eliquis will be on 1/9/2023.

## 2022-12-28 NOTE — TELEPHONE ENCOUNTER
----- Message from Harleen Galindo sent at 12/28/2022  2:10 PM CST -----  Contact: THOMAS JOHNSON [[id number]] [phone]  Type:  Return Call    Who Called: THOMAS JOHNSON [[id number]]  Who Left Message for Patient: Kylee Corral MA  Does the patient know what this is regarding?: Rescheduling   Would the patient rather a call back or a response via [facility]? Phone call   Best Call Back Number: [phone], if no answer please LVM and message on [facility]  Additional Information:  Patient has been exposed to covid and needs to know whether she needs to R/S 12/30/22. She was also taken off blood thinners temporarily in preparation for this procedure, and needs to know if she should get back on them?

## 2023-01-06 ENCOUNTER — TELEPHONE (OUTPATIENT)
Dept: GASTROENTEROLOGY | Facility: CLINIC | Age: 63
End: 2023-01-06
Payer: MEDICAID

## 2023-01-06 NOTE — TELEPHONE ENCOUNTER
Patient tested positive for covid the end of December. Patient called to reschedule EGD/colonoscopy. Patient rescheduled to 1/31/2023.

## 2023-01-06 NOTE — TELEPHONE ENCOUNTER
----- Message from Darren Chamberlain sent at 1/6/2023 12:22 PM CST -----  Contact: pt  .Type:  Needs Medical Advice    Who Called: pt  Would the patient rather a call back or a response via [facility]?  Call back  Best Call Back Number: [phone]   Additional Information: Pt. Is  call to reschedule her procedure for 01/12/2023.

## 2023-02-08 ENCOUNTER — TELEPHONE (OUTPATIENT)
Dept: GASTROENTEROLOGY | Facility: CLINIC | Age: 63
End: 2023-02-08
Payer: MEDICAID

## 2023-02-08 DIAGNOSIS — R10.10 UPPER ABDOMINAL PAIN: Primary | ICD-10-CM

## 2023-02-08 NOTE — TELEPHONE ENCOUNTER
Called patient, she stated that she is having pain in her right side. I informed patient that Ariel Rosas NP wants her to continue taking her dicyclomine. Patient stated that the dicyclomine does not help with her pain and she has not been taking it, and that she doesn't even have anymore of them. Patient also stated that she took herself off of her pantoprazole because it causes her chest to hurt. She stated that she hasn't taken pantoprazole in about 6 months. Patient later said that she will start taking her pantoprazole again, she also stated that she will start taking her dicyclomine again for her pain. I also scheduled patient for a UGI and her GES

## 2023-02-13 ENCOUNTER — HOSPITAL ENCOUNTER (OUTPATIENT)
Dept: RADIOLOGY | Facility: HOSPITAL | Age: 63
Discharge: HOME | End: 2023-02-13
Attending: NURSE PRACTITIONER
Payer: MEDICAID

## 2023-02-13 DIAGNOSIS — R10.10 UPPER ABDOMINAL PAIN: ICD-10-CM

## 2023-02-13 PROCEDURE — A9698 NON-RAD CONTRAST MATERIALNOC: HCPCS | Performed by: NURSE PRACTITIONER

## 2023-02-13 PROCEDURE — 74240 X-RAY XM UPR GI TRC 1CNTRST: CPT | Mod: TC,FY

## 2023-02-13 PROCEDURE — 25500020 PHARM REV CODE 255: Performed by: NURSE PRACTITIONER

## 2023-02-13 PROCEDURE — 74240 X-RAY XM UPR GI TRC 1CNTRST: CPT | Mod: 26,,, | Performed by: RADIOLOGY

## 2023-02-13 PROCEDURE — 74240 FL UPPER GI: ICD-10-PCS | Mod: 26,,, | Performed by: RADIOLOGY

## 2023-02-13 RX ADMIN — BARIUM SULFATE 355 ML: 0.6 SUSPENSION ORAL at 10:02

## 2023-02-20 ENCOUNTER — HOSPITAL ENCOUNTER (OUTPATIENT)
Dept: RADIOLOGY | Facility: HOSPITAL | Age: 63
Discharge: HOME | End: 2023-02-20
Attending: NURSE PRACTITIONER
Payer: MEDICAID

## 2023-02-20 DIAGNOSIS — R10.10 UPPER ABDOMINAL PAIN: ICD-10-CM

## 2023-02-20 PROCEDURE — 78264 NM GASTRIC EMPTYING: ICD-10-PCS | Mod: 26,,, | Performed by: RADIOLOGY

## 2023-02-20 PROCEDURE — 78264 GASTRIC EMPTYING IMG STUDY: CPT | Mod: TC

## 2023-02-20 PROCEDURE — 78264 GASTRIC EMPTYING IMG STUDY: CPT | Mod: 26,,, | Performed by: RADIOLOGY

## 2023-06-09 ENCOUNTER — PATIENT MESSAGE (OUTPATIENT)
Dept: HEMATOLOGY/ONCOLOGY | Facility: CLINIC | Age: 63
End: 2023-06-09
Payer: MEDICAID

## 2023-06-16 NOTE — PROGRESS NOTES
"PATIENT: Dipika Pisano  MRN: [id number]  DATE: 2023    Diagnosis:   1. History of deep vein thrombosis    2. Factor 5 Leiden mutation, heterozygous    3. Morbid obesity      Chief Complaint: history of deep vein thrombosis    Oncologic History:      Oncologic History     Oncologic Treatment     Pathology       Subjective:    History of Present Illness: Ms. Pisano is a 62 y.o. female who presented in 2022 for evaluation and management of deep vein thrombosis. She was referred by Dr. Chilel    - 2022, symptoms were left leg swelling/pain. She underwent ultrasound of legs in [place], which was reported as negative. Of note, she drove to [place] for her sister's , but she reports getting out the car frequently.  - Symptoms worsened, so she went to an emergency room in [place]. She reports that "40 clots" were removed, and a stent was placed.  - he was started on enoxaparin, followed by apixaban  - she reports that she was also diagnosed with clots in her right leg in [place] as well.  - she denies family history of thrombosis.    Interval history:  - she presents for a follow-up appointment for her history of deep vein thrombosis.  - over the weekend, she had bleeding in her left eye.  - she would like an opinion about which antiplatelet/anticoagulant to remain on given her issues.  - today, she endorses fatigue. She denies shortness of breath, chest pain, nausea, vomiting, diarrhea, constipation.    Past medical, surgical, family, and social histories have been reviewed and updated below.    Past Medical History:   Past Medical History:   Diagnosis Date    Arthritis     Claustrophobia     Fibromyalgia     GERD (gastroesophageal reflux disease)     Hypertension        Past Surgical History:   Past Surgical History:   Procedure Laterality Date    BREAST BIOPSY Left     BREAST CYST ASPIRATION      CHOLECYSTECTOMY      COLONOSCOPY N/A 2023    Procedure: COLONOSCOPY;  " Surgeon: Laurie Marquez MD;  Location: [facility];  Service: Endoscopy;  Laterality: N/A;    ESOPHAGOGASTRODUODENOSCOPY N/A 1/31/2023    Procedure: EGD (ESOPHAGOGASTRODUODENOSCOPY);  Surgeon: Laurie Marquez MD;  Location: [facility];  Service: Endoscopy;  Laterality: N/A;    EYE SURGERY Right        Family History:   Family History   Problem Relation Age of Onset    Cancer Mother     Diabetes Father     Heart disease Father     Early death Sister     Heart disease Sister     Breast cancer Maternal Aunt 70       Social History:  reports that she quit smoking about 12 years ago. Her smoking use included cigarettes. She has a 30.00 pack-year smoking history. She has quit using smokeless tobacco. She reports that she does not drink alcohol and does not use drugs.    Allergies:  Review of patient's allergies indicates:   Allergen Reactions    Cat dander     Mold        Medications:  Current Outpatient Medications   Medication Sig Dispense Refill    clopidogreL (PLAVIX) 75 mg tablet Take 75 mg by mouth once daily.      dicyclomine (BENTYL) 20 mg tablet Take 1 tablet (20 mg total) by mouth 3 (three) times daily as needed (abdominal pain). 60 tablet 2    ELIQUIS 5 mg Tab       fluticasone propionate (FLONASE) 50 mcg/actuation nasal spray 1 spray (50 mcg total) by Each Nostril route once daily. 16 g 0    lisinopril 10 MG tablet lisinopril 10 mg tablet   Take 1 tablet every day by oral route.      metoprolol tartrate (LOPRESSOR) 50 MG tablet metoprolol tartrate 50 mg tablet   Take 1 tablet twice a day by oral route.      pantoprazole (PROTONIX) 40 MG tablet Take 1 tablet (40 mg total) by mouth once daily. 90 tablet 3     No current facility-administered medications for this visit.       Review of Systems   Constitutional:  Positive for fatigue. Negative for appetite change and unexpected weight change.   HENT:  Negative for mouth sores.    Eyes:  Negative for visual disturbance.   Respiratory:  Negative for cough and  shortness of breath.    Cardiovascular:  Negative for chest pain.   Gastrointestinal:  Negative for diarrhea.   Genitourinary:  Positive for frequency.   Musculoskeletal:  Positive for back pain.   Skin:  Negative for rash.   Neurological:  Negative for headaches.   Hematological:  Negative for adenopathy.   Psychiatric/Behavioral:  The patient is not nervous/anxious.      ECOG Performance Status:   ECOG SCORE 1          Objective:      Vitals:   Vitals:    06/19/23 1002   BP: (!) 161/85   BP Location: Left arm   Patient Position: Sitting   BP Method: Medium (Automatic)   Pulse: (!) 54   Resp: 16   SpO2: 96%   Weight: 124.3 kg (274 lb 0.5 oz)     BMI: Body mass index is 44.23 kg/m².    Physical Exam  Vitals and nursing note reviewed.   Constitutional:       Appearance: She is well-developed.   HENT:      Head: Normocephalic and atraumatic.   Eyes:      Pupils: Pupils are equal, round, and reactive to light.      Comments: Blood in left eye.   Cardiovascular:      Rate and Rhythm: Normal rate and regular rhythm.   Pulmonary:      Effort: Pulmonary effort is normal.      Breath sounds: Normal breath sounds.   Abdominal:      General: Bowel sounds are normal.      Palpations: Abdomen is soft.   Musculoskeletal:         General: Normal range of motion.      Cervical back: Normal range of motion and neck supple.   Skin:     General: Skin is warm and dry.   Neurological:      Mental Status: She is alert and oriented to person, place, and time.   Psychiatric:         Behavior: Behavior normal.         Thought Content: Thought content normal.         Judgment: Judgment normal.       Laboratory Data:  Labs have been reviewed.    Lab Results   Component Value Date    WBC 7.59 05/02/2023    HGB 13.9 05/02/2023    HCT 42.4 05/02/2023    MCV 91 05/02/2023     05/02/2023       Imaging:    Ultrasound bilateral lower extremity (6/16/22):  Duplex and color flow Doppler and dynamic compression was performed of the bilateral  "lower extremity veins was performed.     FINDINGS:  Duplex and color flow Doppler evaluation does not reveal any evidence of deep venous thrombosis within the common femoral, femoral, popliteal, peroneal, posterior tibial, anterior tibial veins bilaterally.     Impression:     No evidence of deep venous thrombosis in either lower extremity.      Assessment:       1. History of deep vein thrombosis    2. Factor 5 Leiden mutation, heterozygous    3. Morbid obesity           Plan:     Deep vein thrombosis  - I have reviewed outside records  - March 2022, symptoms were left leg swelling/pain. She underwent ultrasound of legs in [place], which was reported as negative.  - Symptoms worsened, so she went to an emergency room in [place]. She reports that "40 clots" were removed, and a stent was placed.  - he was started on enoxaparin, followed by apixaban  - Ultrasound bilateral lower extremity (6/16/22) was negative for thrombosis.  - hypercoagulable workup revealed a heterogeneous factor 5 leiden mutation.  - currently, she is taking clopidogrel and apixaban.  - she asks about which medication is more important: clopidogrel or apixaban. 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 Her plavix could be switched to aspirin. Or, she could continue clopidogrel and discontinue apixaban or transition to apixaban 2.5mg PO BID.  - she will discuss with Dr. Chilel and make a decision after seeing him.  - return to clinic as needed.    2. Morbid obesity  - Body mass index is 44.23 kg/m².  - obesity is a mild risk factor for thrombosis  - I encouraged weight loss    3. Advance Care Planning       Power of   After our discussion (at previous visit), the patient would like to speak to her family before filling out the form.         - return to clinic as needed.    Alec Schmitt M.D.  Hematology/Oncology  [facility] - [address] " [facility], [address]  [place] [address]  Phone: [phone]  Fax: [phone]

## 2023-06-19 ENCOUNTER — OFFICE VISIT (OUTPATIENT)
Dept: HEMATOLOGY/ONCOLOGY | Facility: CLINIC | Age: 63
End: 2023-06-19
Payer: MEDICAID

## 2023-06-19 VITALS
DIASTOLIC BLOOD PRESSURE: 85 MMHG | RESPIRATION RATE: 16 BRPM | OXYGEN SATURATION: 96 % | SYSTOLIC BLOOD PRESSURE: 161 MMHG | HEART RATE: 54 BPM | BODY MASS INDEX: 44.23 KG/M2 | WEIGHT: 274.06 LBS

## 2023-06-19 DIAGNOSIS — Z86.718 HISTORY OF DEEP VEIN THROMBOSIS: Primary | ICD-10-CM

## 2023-06-19 DIAGNOSIS — D68.51 FACTOR 5 LEIDEN MUTATION, HETEROZYGOUS: ICD-10-CM

## 2023-06-19 DIAGNOSIS — E66.01 MORBID OBESITY: ICD-10-CM

## 2023-06-19 PROCEDURE — 99213 OFFICE O/P EST LOW 20 MIN: CPT | Mod: PBBFAC,PN | Performed by: INTERNAL MEDICINE

## 2023-06-19 PROCEDURE — 3008F BODY MASS INDEX DOCD: CPT | Mod: CPTII,,, | Performed by: INTERNAL MEDICINE

## 2023-06-19 PROCEDURE — 99214 PR OFFICE/OUTPT VISIT, EST, LEVL IV, 30-39 MIN: ICD-10-PCS | Mod: S$PBB,,, | Performed by: INTERNAL MEDICINE

## 2023-06-19 PROCEDURE — 4010F ACE/ARB THERAPY RXD/TAKEN: CPT | Mod: CPTII,,, | Performed by: INTERNAL MEDICINE

## 2023-06-19 PROCEDURE — 3044F PR MOST RECENT HEMOGLOBIN A1C LEVEL <7.0%: ICD-10-PCS | Mod: CPTII,,, | Performed by: INTERNAL MEDICINE

## 2023-06-19 PROCEDURE — 1159F MED LIST DOCD IN RCRD: CPT | Mod: CPTII,,, | Performed by: INTERNAL MEDICINE

## 2023-06-19 PROCEDURE — 1160F RVW MEDS BY RX/DR IN RCRD: CPT | Mod: CPTII,,, | Performed by: INTERNAL MEDICINE

## 2023-06-19 PROCEDURE — 99214 OFFICE O/P EST MOD 30 MIN: CPT | Mod: S$PBB,,, | Performed by: INTERNAL MEDICINE

## 2023-06-19 PROCEDURE — 1160F PR REVIEW ALL MEDS BY PRESCRIBER/CLIN PHARMACIST DOCUMENTED: ICD-10-PCS | Mod: CPTII,,, | Performed by: INTERNAL MEDICINE

## 2023-06-19 PROCEDURE — 3077F PR MOST RECENT SYSTOLIC BLOOD PRESSURE >= 140 MM HG: ICD-10-PCS | Mod: CPTII,,, | Performed by: INTERNAL MEDICINE

## 2023-06-19 PROCEDURE — 99999 PR PBB SHADOW E&M-EST. PATIENT-LVL III: CPT | Mod: PBBFAC,,, | Performed by: INTERNAL MEDICINE

## 2023-06-19 PROCEDURE — 3077F SYST BP >= 140 MM HG: CPT | Mod: CPTII,,, | Performed by: INTERNAL MEDICINE

## 2023-06-19 PROCEDURE — 1159F PR MEDICATION LIST DOCUMENTED IN MEDICAL RECORD: ICD-10-PCS | Mod: CPTII,,, | Performed by: INTERNAL MEDICINE

## 2023-06-19 PROCEDURE — 3079F DIAST BP 80-89 MM HG: CPT | Mod: CPTII,,, | Performed by: INTERNAL MEDICINE

## 2023-06-19 PROCEDURE — 3079F PR MOST RECENT DIASTOLIC BLOOD PRESSURE 80-89 MM HG: ICD-10-PCS | Mod: CPTII,,, | Performed by: INTERNAL MEDICINE

## 2023-06-19 PROCEDURE — 99999 PR PBB SHADOW E&M-EST. PATIENT-LVL III: ICD-10-PCS | Mod: PBBFAC,,, | Performed by: INTERNAL MEDICINE

## 2023-06-19 PROCEDURE — 3008F PR BODY MASS INDEX (BMI) DOCUMENTED: ICD-10-PCS | Mod: CPTII,,, | Performed by: INTERNAL MEDICINE

## 2023-06-19 PROCEDURE — 4010F PR ACE/ARB THEARPY RXD/TAKEN: ICD-10-PCS | Mod: CPTII,,, | Performed by: INTERNAL MEDICINE

## 2023-06-19 PROCEDURE — 3044F HG A1C LEVEL LT 7.0%: CPT | Mod: CPTII,,, | Performed by: INTERNAL MEDICINE

## 2025-08-08 ENCOUNTER — TELEPHONE (OUTPATIENT)
Dept: ORTHOPEDICS | Facility: CLINIC | Age: 65
End: 2025-08-08

## 2025-08-08 ENCOUNTER — OFFICE VISIT (OUTPATIENT)
Dept: ORTHOPEDICS | Facility: CLINIC | Age: 65
End: 2025-08-08
Payer: MEDICARE

## 2025-08-08 DIAGNOSIS — R52 PAIN: Primary | ICD-10-CM

## 2025-08-08 DIAGNOSIS — M25.561 ACUTE PAIN OF RIGHT KNEE: Primary | ICD-10-CM

## 2025-08-08 DIAGNOSIS — M17.11 PRIMARY OSTEOARTHRITIS OF RIGHT KNEE: ICD-10-CM

## 2025-08-08 PROCEDURE — 99999 PR PBB SHADOW E&M-EST. PATIENT-LVL III: CPT | Mod: PBBFAC,,,

## 2025-08-08 NOTE — PROGRESS NOTES
Subjective:      Patient ID: Dipika Pisano is a 64 y.o. female.    Chief Complaint: Pain of the Right Knee      HPI: Dipika Pisano is a 64 y.o. female who presents to clinic for right knee pain. The patient denies known SHANICE, but the pain first started 3 weeks ago and is becoming progressively worse. . Pain is located anteriorly and posteriorly. She reports that the pain is a 5/10 aching and dull pain today. Reports occasional burning pain. Pain is 8/10 at its worst.  The pain is aggravated by at night and when first waking up in the morning.  Associated symptoms include gelling. Denies numbness, tingling, radiation and inability to bear weight.. The pain is affecting ADLs and limiting desired level of activity. There is not a history of previous surgery to the knee.      Previous treatments include none.     Occupation: retired     Ambulating: unassisted  Diabetic:  No  Smoking:  She quit smoking approximately 16 years ago.  History of DVT/PE: Positive; Eliquis 5mg, Clopidogrel 75mg     PAST MEDICAL HISTORY:    Past Medical History:   Diagnosis Date    Arthritis     Claustrophobia     Fibromyalgia     GERD (gastroesophageal reflux disease)     Hypertension      PAST SURGICAL HISTORY:    Past Surgical History:   Procedure Laterality Date    BREAST BIOPSY Left     BREAST CYST ASPIRATION      CHOLECYSTECTOMY      COLONOSCOPY N/A 2023    Procedure: COLONOSCOPY;  Surgeon: Laurie Marquez MD;  Location: [facility];  Service: Endoscopy;  Laterality: N/A;    ESOPHAGOGASTRODUODENOSCOPY N/A 2023    Procedure: EGD (ESOPHAGOGASTRODUODENOSCOPY);  Surgeon: Laurie Marquez MD;  Location: [facility];  Service: Endoscopy;  Laterality: N/A;    EYE SURGERY Right      FAMILY HISTORY:    Family History   Problem Relation Name Age of Onset    Cancer Mother      Diabetes Father      Heart disease Father      Early death Sister 1      Heart disease Sister 1      Breast cancer Maternal  Aunt  70     SOCIAL HISTORY:    Social History     Occupational History    Not on file   Tobacco Use    Smoking status: Former     Current packs/day: 0.00     Average packs/day: 1 pack/day for 30.0 years (30.0 ttl pk-yrs)     Types: Cigarettes     Start date: 1981     Quit date: 2011     Years since quittin.5    Smokeless tobacco: Former   Substance and Sexual Activity    Alcohol use: No    Drug use: No    Sexual activity: Not Currently     Partners: Male        MEDICATIONS: Current Medications[1]  ALLERGIES:   Review of patient's allergies indicates:   Allergen Reactions    Cat dander     Mold        Review of Systems:  Constitution: Negative for chills, fever and night sweats.   HENT: Negative for congestion and headaches.    Eyes: Negative for blurred vision or vision loss.  Cardiovascular: Negative for chest pain and syncope.   Respiratory: Negative for cough and shortness of breath.    Endocrine: Negative for polydipsia, polyphagia and polyuria.   Hematologic/Lymphatic: Negative for bleeding problem. Does not bruise/bleed easily.   Skin: Negative for dry skin, itching and rash.   Musculoskeletal: See HPI.   Gastrointestinal: Negative for abdominal pain and bowel incontinence.   Genitourinary: Negative for bladder incontinence and nocturia.   Neurological: Negative for disturbances in coordination, loss of balance and seizures.   Psychiatric/Behavioral: Negative for depression. The patient does not have insomnia.    Allergic/Immunologic: Negative for hives and persistent infections.          Objective:        There were no vitals filed for this visit.    PHYSICAL EXAM:  General: Alert & oriented x3, well-developed and well-nourished, in no acute distress, sitting comfortably in the exam room.  Skin: Warm and dry. Capillary refill less than 2 seconds.   Head: Normocephalic and atraumatic.   Eyes: Sclera appear normal.   Nose: No deformities seen.   Ears: No deformities seen.   Neck: No tracheal  deviation present.   Pulmonary/Chest: Breathing unlabored.   Neurological: Alert and oriented to person, place, and time.   Psychiatric: Mood is pleasant and affect appropriate.     RIGHT KNEE        Body habitus is overweight.         The patient walks with a limp.        Hip irritability:  negative.         The skin over the knee is intact.        Knee effusion:  none         Tenderness:  Hamstrings.        Range of Motion:  Flexion:  130 °  Extension:  0 °        Ligament exam:   MCL:  intact   Lachman:  intact              Posterior sag:  intact    LCL:  intact        Patellar apprehension:  negative.        Popliteal cyst:  negative.        Patellar crepitation:  present.        Pulses DP present, PT present.        Motor:  normal 5/5 strength in all tested muscle groups.         Sensory:  normal.    Imaging:   X-Rays:  4 views of right knee obtained in the Orthopedic clinic today, and independently reviewed, show: Degenerative changes right knee including tri-compartmental joint space narrowing and osteophyte formation. No acute fractures or dislocations. No evidence of foreign bodies.       Ultrasound right lower extremity:  No evidence of DVT in the right lower extremity.      Assessment:       1. Acute pain of right knee    2. Primary osteoarthritis of right knee          Plan:            I explained the nature of the problem to the patient.     I discussed at length with the patient all the different treatment options available for her right knee including anti-inflammatories, acetaminophen, bracing, rest, ice, heat, physical therapy, corticosteroid injections, viscosupplement injections, and Iovera.     I explained the potential role of knee replacement in the treatment of this condition. I also explained the typical clinical course.  The usual complications that that can occur were also discussed. The patient understands that if non-surgical measures do not adequately control symptoms, surgery will be  considered in the future. The patient agrees to discuss this again at follow-up, if clinically warranted.    Medications:  Encouraged patient to take OTC Tylenol arthritis as needed for pain management.  HEP:  22749 - Etelvina Almanzar PA-C instructed and demonstrated a knee strengthening & ROM HEP. The patient then demonstrated understanding of exercises and proper technique. This program was performed for 15 minutes.   Procedures:  None today. Consider CSI if symptoms worsen.  DME:  Continue use of brace as needed with activities.  Pain Management: Ice compress to the affected area 2-3x a day for 15-20 minutes as needed for pain management.        Follow-Up: 6-8 weeks for follow up. No new x-rays needed next visit.        All of the patient's questions were answered and the patient will contact us if they have any questions or concerns in the interim.        Etelvina Almanzar PA-C  [facility]  Orthopedic Surgery    Medical Dictation software was used during the dictation of portions or the entirety of this medical record.  Phonetic or grammatic errors may exist due to the use of this software. For clarification, refer to the author of the document.               [1]   Current Outpatient Medications:     clopidogreL (PLAVIX) 75 mg tablet, Take 75 mg by mouth once daily., Disp: , Rfl:     dicyclomine (BENTYL) 20 mg tablet, Take 1 tablet (20 mg total) by mouth 3 (three) times daily as needed (abdominal pain)., Disp: 60 tablet, Rfl: 2    ELIQUIS 5 mg Tab, , Disp: , Rfl:     fluticasone propionate (FLONASE) 50 mcg/actuation nasal spray, 1 spray (50 mcg total) by Each Nostril route once daily., Disp: 16 g, Rfl: 0    lisinopril 10 MG tablet, lisinopril 10 mg tablet  Take 1 tablet every day by oral route., Disp: , Rfl:     metoprolol tartrate (LOPRESSOR) 50 MG tablet, metoprolol tartrate 50 mg tablet  Take 1 tablet twice a day by oral route., Disp: , Rfl:     pantoprazole (PROTONIX) 40 MG tablet, Take 1 tablet (40 mg  total) by mouth once daily., Disp: 90 tablet, Rfl: 3

## 2025-08-18 ENCOUNTER — PATIENT MESSAGE (OUTPATIENT)
Dept: ORTHOPEDICS | Facility: CLINIC | Age: 65
End: 2025-08-18
Payer: MEDICARE